# Patient Record
Sex: FEMALE | Race: WHITE | NOT HISPANIC OR LATINO | Employment: FULL TIME | URBAN - METROPOLITAN AREA
[De-identification: names, ages, dates, MRNs, and addresses within clinical notes are randomized per-mention and may not be internally consistent; named-entity substitution may affect disease eponyms.]

---

## 2023-05-08 ENCOUNTER — HOSPITAL ENCOUNTER (EMERGENCY)
Facility: HOSPITAL | Age: 23
Discharge: HOME | End: 2023-05-08
Attending: EMERGENCY MEDICINE | Admitting: EMERGENCY MEDICINE
Payer: COMMERCIAL

## 2023-05-08 VITALS
HEIGHT: 66 IN | RESPIRATION RATE: 16 BRPM | DIASTOLIC BLOOD PRESSURE: 66 MMHG | BODY MASS INDEX: 47.09 KG/M2 | OXYGEN SATURATION: 99 % | HEART RATE: 71 BPM | TEMPERATURE: 98 F | WEIGHT: 293 LBS | SYSTOLIC BLOOD PRESSURE: 102 MMHG

## 2023-05-08 DIAGNOSIS — N93.9 VAGINAL BLEEDING: ICD-10-CM

## 2023-05-08 DIAGNOSIS — O20.0 PREGNANCY WITH EARLY THREATENED ABORTION: Primary | ICD-10-CM

## 2023-05-08 LAB
ABO GROUP BLD: NORMAL
BACTERIA UR QL AUTO: ABNORMAL /HPF
BASOPHILS # BLD AUTO: 0.1 10*3/MM3 (ref 0–0.2)
BASOPHILS NFR BLD AUTO: 0.7 % (ref 0–1.5)
BILIRUB UR QL STRIP: NEGATIVE
CLARITY UR: CLEAR
COLOR UR: ABNORMAL
DEPRECATED RDW RBC AUTO: 43.3 FL (ref 37–54)
EOSINOPHIL # BLD AUTO: 0.1 10*3/MM3 (ref 0–0.4)
EOSINOPHIL NFR BLD AUTO: 0.8 % (ref 0.3–6.2)
ERYTHROCYTE [DISTWIDTH] IN BLOOD BY AUTOMATED COUNT: 16.7 % (ref 12.3–15.4)
GLUCOSE UR STRIP-MCNC: NEGATIVE MG/DL
HCG INTACT+B SERPL-ACNC: 882.1 MIU/ML
HCT VFR BLD AUTO: 36 % (ref 34–46.6)
HGB BLD-MCNC: 11.5 G/DL (ref 12–15.9)
HGB UR QL STRIP.AUTO: ABNORMAL
HOLD SPECIMEN: NORMAL
HYALINE CASTS UR QL AUTO: ABNORMAL /LPF
KETONES UR QL STRIP: NEGATIVE
LEUKOCYTE ESTERASE UR QL STRIP.AUTO: NEGATIVE
LYMPHOCYTES # BLD AUTO: 2.2 10*3/MM3 (ref 0.7–3.1)
LYMPHOCYTES NFR BLD AUTO: 22.5 % (ref 19.6–45.3)
MCH RBC QN AUTO: 23.7 PG (ref 26.6–33)
MCHC RBC AUTO-ENTMCNC: 31.9 G/DL (ref 31.5–35.7)
MCV RBC AUTO: 74.4 FL (ref 79–97)
MONOCYTES # BLD AUTO: 0.6 10*3/MM3 (ref 0.1–0.9)
MONOCYTES NFR BLD AUTO: 6.6 % (ref 5–12)
NEUTROPHILS NFR BLD AUTO: 6.8 10*3/MM3 (ref 1.7–7)
NEUTROPHILS NFR BLD AUTO: 69.4 % (ref 42.7–76)
NITRITE UR QL STRIP: NEGATIVE
NRBC BLD AUTO-RTO: 0 /100 WBC (ref 0–0.2)
NUMBER OF DOSES: NORMAL
PH UR STRIP.AUTO: 5.5 [PH] (ref 5–8)
PLATELET # BLD AUTO: 344 10*3/MM3 (ref 140–450)
PMV BLD AUTO: 6.9 FL (ref 6–12)
PROT UR QL STRIP: ABNORMAL
RBC # BLD AUTO: 4.84 10*6/MM3 (ref 3.77–5.28)
RBC # UR STRIP: ABNORMAL /HPF
REF LAB TEST METHOD: ABNORMAL
RH BLD: POSITIVE
SP GR UR STRIP: 1.03 (ref 1–1.03)
SQUAMOUS #/AREA URNS HPF: ABNORMAL /HPF
UROBILINOGEN UR QL STRIP: ABNORMAL
WBC # UR STRIP: ABNORMAL /HPF
WBC NRBC COR # BLD: 9.8 10*3/MM3 (ref 3.4–10.8)

## 2023-05-08 PROCEDURE — 86900 BLOOD TYPING SEROLOGIC ABO: CPT | Performed by: PHYSICIAN ASSISTANT

## 2023-05-08 PROCEDURE — 81001 URINALYSIS AUTO W/SCOPE: CPT

## 2023-05-08 PROCEDURE — 99283 EMERGENCY DEPT VISIT LOW MDM: CPT

## 2023-05-08 PROCEDURE — 84702 CHORIONIC GONADOTROPIN TEST: CPT | Performed by: PHYSICIAN ASSISTANT

## 2023-05-08 PROCEDURE — 86901 BLOOD TYPING SEROLOGIC RH(D): CPT | Performed by: PHYSICIAN ASSISTANT

## 2023-05-08 PROCEDURE — 85025 COMPLETE CBC W/AUTO DIFF WBC: CPT | Performed by: PHYSICIAN ASSISTANT

## 2023-05-08 RX ORDER — SODIUM CHLORIDE 0.9 % (FLUSH) 0.9 %
10 SYRINGE (ML) INJECTION AS NEEDED
Status: DISCONTINUED | OUTPATIENT
Start: 2023-05-08 | End: 2023-05-09 | Stop reason: HOSPADM

## 2023-05-08 NOTE — ED PROVIDER NOTES
Subjective    Provider in Triage Note  Patient is a 22-year-old female who is G1, P0 Ab0 about 5 to 6 weeks gestation per last menstrual period presenting to the ED with worsening abdominal pain and vaginal bleeding.  Patient states she was seen at [facility] earlier today was told she had a threatened miscarriage.  Patient came here because her bleeding worsened.  She denies any lightheadedness, dizziness, or syncopal episodes.  No reports of fever.  No urinary complaints.    [place] was made aware to get records from [facility] as I do not see them in care everywhere        History of Present Illness  I have read the above note written by previous provider in triage and attest that is accurate for patient's HPI.        Review of Systems   Constitutional: Negative for appetite change and fever.   HENT: Negative for congestion, rhinorrhea and sore throat.    Respiratory: Negative for chest tightness and shortness of breath.    Gastrointestinal: Negative for abdominal pain and nausea.   Genitourinary: Positive for pelvic pain and vaginal bleeding. Negative for flank pain and vaginal pain.   Neurological: Negative for syncope and facial asymmetry.   Psychiatric/Behavioral: Negative for confusion. The patient is not nervous/anxious.    All other systems reviewed and are negative.      History reviewed. No pertinent past medical history.    No Known Allergies    History reviewed. No pertinent surgical history.    History reviewed. No pertinent family history.    Social History     Socioeconomic History   • Marital status: Single           Objective   Physical Exam  Vitals and nursing note reviewed.   Constitutional:       General: She is awake. She is not in acute distress.     Appearance: Normal appearance. She is well-developed. She is obese. She is not diaphoretic.   HENT:      Head: Normocephalic and atraumatic.      Right Ear: Tympanic membrane, ear canal and external ear normal.      Left Ear:  "Tympanic membrane, ear canal and external ear normal.   Eyes:      Extraocular Movements: Extraocular movements intact.      Pupils: Pupils are equal, round, and reactive to light.   Cardiovascular:      Rate and Rhythm: Normal rate and regular rhythm.      Pulses: Normal pulses.      Heart sounds: Normal heart sounds. No murmur heard.  Pulmonary:      Effort: Pulmonary effort is normal.      Breath sounds: Normal breath sounds.   Abdominal:      General: Bowel sounds are normal.      Palpations: Abdomen is soft.   Musculoskeletal:         General: No swelling. Normal range of motion.      Cervical back: Normal range of motion and neck supple.   Skin:     General: Skin is warm and dry.      Capillary Refill: Capillary refill takes less than 2 seconds.   Neurological:      General: No focal deficit present.      Mental Status: She is alert and oriented to person, place, and time. Mental status is at baseline.      GCS: GCS eye subscore is 4. GCS verbal subscore is 5. GCS motor subscore is 6.      Cranial Nerves: Cranial nerves 2-12 are intact.      Sensory: Sensation is intact.      Motor: Motor function is intact.      Deep Tendon Reflexes: Reflexes are normal and symmetric.   Psychiatric:         Mood and Affect: Mood normal.         Behavior: Behavior normal. Behavior is cooperative.         Procedures           ED Course      /69 (BP Location: Left arm, Patient Position: Sitting)   Pulse 79   Temp 98 °F (36.7 °C) (Oral)   Resp 18   Ht 167.6 cm (66\")   Wt (!) 140 kg (308 lb 10.3 oz)   LMP 03/22/2023   SpO2 96%   BMI 49.82 kg/m²   Labs Reviewed   CBC WITH AUTO DIFFERENTIAL - Abnormal; Notable for the following components:       Result Value    Hemoglobin 11.5 (*)     MCV 74.4 (*)     MCH 23.7 (*)     RDW 16.7 (*)     All other components within normal limits   URINALYSIS W/ MICROSCOPIC IF INDICATED (NO CULTURE) - Abnormal; Notable for the following components:    Color, UA Dark Yellow (*)     Blood, " UA Large (3+) (*)     Protein, UA 30 mg/dL (1+) (*)     All other components within normal limits   URINALYSIS, MICROSCOPIC ONLY - Abnormal; Notable for the following components:    RBC, UA Too Numerous to Count (*)     WBC, UA 0-2 (*)     All other components within normal limits   HCG, QUANTITATIVE, PREGNANCY    Narrative:     HCG Ranges by Gestational Age    Females - non-pregnant premenopausal   </= 1mIU/mL HCG  Females - postmenopausal               </= 7mIU/mL HCG    3 Weeks       5.4   -      72 mIU/mL  4 Weeks      10.2   -     708 mIU/mL  5 Weeks       217   -   8,245 mIU/mL  6 Weeks       152   -  32,177 mIU/mL  7 Weeks     4,059   - 153,767 mIU/mL  8 Weeks    31,366   - 149,094 mIU/mL  9 Weeks    59,109   - 135,901 mIU/mL  10 Weeks   44,186   - 170,409 mIU/mL  12 Weeks   27,107   - 201,615 mIU/mL  14 Weeks   24,302   -  93,646 mIU/mL  15 Weeks   12,540   -  69,747 mIU/mL  16 Weeks    8,904   -  55,332 mIU/mL  17 Weeks    8,240   -  51,793 mIU/mL  18 Weeks    9,649   -  55,271 mIU/mL      RHIG EVALUATION   DOSES OF RH IMMUNE GLOBULIN   CBC AND DIFFERENTIAL    Narrative:     The following orders were created for panel order CBC & Differential.  Procedure                               Abnormality         Status                     ---------                               -----------         ------                     CBC Auto Differential[[id number]]        Abnormal            Final result               Scan Slide[[id number]]                                                                    Please view results for these tests on the individual orders.   EXTRA TUBES    Narrative:     The following orders were created for panel order Extra Tubes.  Procedure                               Abnormality         Status                     ---------                               -----------         ------                     Green Top (Gel)[[id number]]                                  Final result              "    Please view results for these tests on the individual orders.   GREEN TOP     Medications   sodium chloride 0.9 % flush 10 mL (has no administration in time range)     No radiology results for the last day                                       Medical Decision Making  Patient is a 22-year-old obese  female who reports being 5 weeks pregnant presents with complaints of vaginal bleeding that started earlier today.  She states that she was seen at [facility] and was discharged with \"threatened miscarriage.\"  She states that she has had worsening cramps and bleeding since she was discharged and was told to return if her symptoms worsen.  Exam reveals normal S1/S2 without clicks or murmurs.  Patient is not tachycardic or pale.  No JVD or leg swelling.  Lungs clear on auscultation in all fields.  Abdomen was found to be soft and nontender with normal bowel sounds heard throughout.  No CVA tenderness.  No cervical lymphadenopathy.  Pupils PERRLA.  GCS 15.  Initial differentials include threatened miscarriage, PID, acute UTI, trauma.    Records were sent from [facility], where patient was seen earlier today.  Upon review of these records, it was noted that patient had a transvaginal ultrasound.  Findings included an early intrauterine pregnancy at 5 weeks gestation by mean sac diameter.  Probable intrinsic fetal pole will also at 5 weeks by crown-rump length of 2 mm.  Cardiac activity was not yet detectable.  A short interval follow-up pelvic ultrasound to confirm viability was recommended.  Quantitative hCG was also collected at this visit and was 108 7.  Blood type is a positive and she is not a RhoGAM candidate.  Labs were recollected today and patient is mildly anemic with a hemoglobin 11.5.  No leukocytosis.  Quantitative hCG here was 882, concerning for spontaneous .  Repeat ultrasound was considered but not completed at this time as I do not " believe it will affect plan of care.  Urine negative for acute UTI.  Upon reassessment, patient denies any changes in bleeding pattern.  Results were discussed with the patient, who verbalized understanding is agreeable to plan of care.  Due to concern for , close follow-up was recommended patient states that she has an appointment already set for Wednesday.  I have advised that she gets a repeat quantitative hCG done at that time as well.  Reasons for return were discussed with the patient and she verbalized understanding.  Hemodynamically she has remained stable and she is in no acute distress.  Patient was able to ambulate upright steadily without assistance upon discharge.      I discussed the findings with patient who voices understanding of discharge instructions, signs and symptoms requiring return to the ED; discharged improved and stable condition with follow-up for reevaluation.    Patient is aware that discharge does not mean that nothing is wrong but it indicates no emergency is present and they must continue care with follow-up as given below or physician of their choice.    This document is intended for medical expert use only.  Reading of this document by patients and/or patient's family without participating medical staff guidance may result in misinterpretation and unintended morbidity.  Any interpretation of such data is the responsibility of the patient and/or family member responsible for the patient in concert with their primary or specialist providers, not to be left for sources of online search as such as Bio, ki work or similar queries.  Relying on these approaches to knowledge may result in misinterpretation, misguided goals of care and even death should patient or family members try recommendations outside of the realm of professional medical care in a supervised inpatient environment.    This medical document was created using Dragon dictation system. Some errors in speech  recognition may occur.    Amount and/or Complexity of Data Reviewed  Labs: ordered. Decision-making details documented in ED Course.      Risk  Prescription drug management.          Final diagnoses:   Pregnancy with early threatened    Vaginal bleeding       ED Disposition  ED Disposition     ED Disposition   Discharge    Condition   Stable    Comment   --             [facility]  [address] [address]  [phone]        PATIENT CONNECTION - [facility] [address]  [phone]             Medication List      No changes were made to your prescriptions during this visit.          Felicity Somers, APRN  [phone]

## 2023-05-09 NOTE — DISCHARGE INSTRUCTIONS
Take Tylenol as needed for pain and cramping.  Continue monitoring amount of bleeding.  Rest.  Drink lots of fluids and avoid heavy lifting or straining.    As discussed, follow-up with OB/GYN as you have scheduled.  Follow-up with primary care provider as needed.  If you do not have a primary care provider, contact patient connection to establish 1.    Return to the ER for new or worsening symptoms.

## 2023-05-09 NOTE — ED NOTES
Pt reports low abdominal cramping with vaginal bleeding that started this morning. Pt was seen at [facility] earlier today and had bloodwork and US done-pt reports was told she was 5.5 weeks pregnant, pt was dx with threatened miscarriage. Pt reports her vaginal bleeding has increased and she is now passing clots and has increased low abdominal pain.

## 2023-12-19 ENCOUNTER — PATIENT ROUNDING (BHMG ONLY) (OUTPATIENT)
Dept: ENDOCRINOLOGY | Facility: CLINIC | Age: 23
End: 2023-12-19
Payer: COMMERCIAL

## 2023-12-19 ENCOUNTER — OFFICE VISIT (OUTPATIENT)
Dept: ENDOCRINOLOGY | Facility: CLINIC | Age: 23
End: 2023-12-19
Payer: COMMERCIAL

## 2023-12-19 VITALS
HEIGHT: 66 IN | SYSTOLIC BLOOD PRESSURE: 118 MMHG | OXYGEN SATURATION: 98 % | WEIGHT: 293 LBS | HEART RATE: 86 BPM | DIASTOLIC BLOOD PRESSURE: 70 MMHG | BODY MASS INDEX: 47.09 KG/M2

## 2023-12-19 DIAGNOSIS — O24.419 GESTATIONAL DIABETES MELLITUS (GDM) IN SECOND TRIMESTER, GESTATIONAL DIABETES METHOD OF CONTROL UNSPECIFIED: Primary | ICD-10-CM

## 2023-12-19 DIAGNOSIS — E66.01 MORBID OBESITY: ICD-10-CM

## 2023-12-19 RX ORDER — BLOOD-GLUCOSE METER
1 KIT MISCELLANEOUS
Qty: 1 EACH | Refills: 0 | Status: SHIPPED | OUTPATIENT
Start: 2023-12-19 | End: 2023-12-22 | Stop reason: SDUPTHER

## 2023-12-19 RX ORDER — LANCETS 30 GAUGE
1 EACH MISCELLANEOUS
Qty: 200 EACH | Refills: 5 | Status: SHIPPED | OUTPATIENT
Start: 2023-12-19

## 2023-12-19 RX ORDER — GLUCOSAMINE HCL/CHONDROITIN SU 500-400 MG
1 CAPSULE ORAL
Qty: 200 EACH | Refills: 5 | Status: SHIPPED | OUTPATIENT
Start: 2023-12-19

## 2023-12-19 NOTE — PROGRESS NOTES
-----------------------------------------------------------------  ENDOCRINE CLINIC NOTE  -----------------------------------------------------------------        PATIENT NAME: Verónica Rivers  PATIENT : 2000 AGE: 23 y.o.  MRN NUMBER: [id number]  PRIMARY CARE: Zulma Patel MD    ==========================================================================    CHIEF COMPLAINT: GDM  DATE OF SERVICE: 23         ==========================================================================    HPI / SUBJECTIVE    23 y.o. female is seen in the clinic today for evaluation of GDM.  Patient is currently G2, P0.  Currently 16 weeks pregnant.  Estimated date of delivery: May 29, 2024.  Patient recently had a blood work done on 10/3/2023 which showed A1c of 5.5% and therefore patient underwent OGTT on 2023 which showed evidence of significant hyperglycemia.  Patient is now referred to endocrinology clinic for further evaluation.  Patient have a family history significant for type 2 diabetes.  Patient is not currently checking blood sugars at home.  No eye doctor/ophthalmology evaluation.  Denies any numbness and tingling in the feet.  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  Consuming 2-3 meals a day.  No structured exercise.    ==========================================================================                                                PAST MEDICAL HISTORY    History reviewed. No pertinent past medical history.    ==========================================================================    PAST SURGICAL HISTORY    Past Surgical History:   Procedure Laterality Date    LAPAROSCOPIC APPENDECTOMY  2019       ==========================================================================    FAMILY HISTORY    Family History   Problem Relation Age of Onset    Diabetes Mother   "      ==========================================================================    SOCIAL HISTORY    Social History     Socioeconomic History    Marital status: Single   Tobacco Use    Smoking status: Never    Smokeless tobacco: Never   Vaping Use    Vaping Use: Never used   Substance and Sexual Activity    Alcohol use: Not Currently    Drug use: Never    Sexual activity: Defer       ==========================================================================    MEDICATIONS      Current Outpatient Medications:     ASPIRIN 81 PO, Take  by mouth Daily., Disp: , Rfl:     Prenatal MV-Min-Fe Fum-FA-DHA (PRENATAL 1 PO), Take  by mouth Daily., Disp: , Rfl:     Alcohol Swabs 70 % pads, Use 1 each 4 (Four) Times a Day Before Meals & at Bedtime., Disp: 200 each, Rfl: 5    glucose blood test strip, 1 each by Other route 4 (Four) Times a Day Before Meals & at Bedtime., Disp: 200 each, Rfl: 5    glucose monitor monitoring kit, Use 1 each 4 (Four) Times a Day Before Meals & at Bedtime., Disp: 1 each, Rfl: 0    Lancets misc, Use 1 each 4 (Four) Times a Day Before Meals & at Bedtime., Disp: 200 each, Rfl: 5    ==========================================================================    ALLERGIES    No Known Allergies    ==========================================================================    OBJECTIVE    Vitals:    12/19/23 1111   BP: 118/70   Pulse: 86   SpO2: 98%     Body mass index is 51 kg/m².     General: Alert, cooperative, no acute distress  Thyroid:  no enlargement/tenderness/palpable nodules  Lungs: Clear to auscultation bilaterally, respirations unlabored  Heart: Regular rate and rhythm, S1 and S2 normal, no murmur, rub or gallop  Abdomen: Soft, NT, ND and Bowel sounds Positive  Extremities:  Extremities normal, atraumatic, no cyanosis or edema    ==========================================================================    LAB EVALUATION    No results found for: \"GLUCOSE\", \"BUN\", \"CREATININE\", \"EGFRIFNONA\", " "\"EGFRIFAFRI\", \"BCR\", \"K\", \"CO2\", \"CALCIUM\", \"PROTENTOTREF\", \"ALBUMIN\", \"LABIL2\", \"BILIRUBIN\", \"AST\", \"ALT\", \"CHOL\", \"TRIG\", \"HDL\", \"LDL\"  No results found for: \"HGBA1C\"  No results found for: \"GLUF\", \"MICROALBUR\", \"CREATININE\"  No results found for: \"TSH\", \"FREET4\", \"THYROIDAB\"    ==========================================================================    ASSESSMENT AND PLAN    # Gestational diabetes mellitus  - Discussed with patient in detail about pathophysiology of type 2 diabetes and of GDM to which she verbalized understanding  - Discussed with patient about dietary modification and referred patient to diabetic education along with GDM class  - Counseled patient about lifestyle modification including exercise every day to which she verbalized understanding and agreed  - Provide detail about diabetic monitoring including blood sugar fasting and 2-hour after each meal and maintaining the log and sending logs to the office every week on Monday to which she verbalized understanding and agreed with care  - Discussed with patient about therapeutic options and plan to use insulin if needed to which she verbalized understanding  - Target blood sugar will remain less than 90 and fasting less than 120 2 hours each meal  - Patient to follow-up in my clinic back again in 4 weeks time  - Depending on the blood sugars number if needed can also introduce insulin therapy    # Morbid obesity with BMI of 51      Thank you for courtesy of consultation.    Return to clinic: 4 weeks    Entire assessment and plan was discussed and counseled the patient in detail to which patient verbalized understanding and agreed with care.  Answered all queries and concerns.    This note was created using voice recognition software and is inherently subject to errors including those of syntax and \"sound-alike\" substitutions which may escape proofreading.  In such instances, original meaning may be extrapolated by contextual derivation.    Note: " Portions of this note may have been copied from previous notes but documentation have been reviewed and edited as necessary to support clinical decision making for today's visit.    ==========================================================================    INFORMATION PROVIDED TO PATIENT    Patient Instructions   Please,    - Check your blood sugars 4 times a day: Fasting, 2 hours after each meal.  Please maintain the log of your blood sugars and maik the office every Monday morning.    - Please try to consume high-protein and low carbohydrate diet.    - Please try to maintain exercise scheduled at least 30 minutes every day.    - Please make appointment for diabetic education.    - Please make appointment for diabetic eye care.    Follow-up in my clinic back again in 4 weeks time.    Thank you for your visit today.    If you have any questions or concerns please feel free to reach out of the office.       ==========================================================================  Austin Mcnamara MD  Department of Endocrine, Diabetes and Metabolism  [facility], [place]  ==========================================================================

## 2023-12-19 NOTE — PATIENT INSTRUCTIONS
Please,    - Check your blood sugars 4 times a day: Fasting, 2 hours after each meal.  Please maintain the log of your blood sugars and maik the office every Monday morning.    - Please try to consume high-protein and low carbohydrate diet.    - Please try to maintain exercise scheduled at least 30 minutes every day.    - Please make appointment for diabetic education.    - Please make appointment for diabetic eye care.    Follow-up in my clinic back again in 4 weeks time.    Thank you for your visit today.    If you have any questions or concerns please feel free to reach out of the office.

## 2023-12-19 NOTE — PROGRESS NOTES
December 19, 2023    Hello, may I speak with Verónica Rivers?    My name is Eunice       I am  with [facility]  [address] [facility] [address] [place] [address].    Before we get started may I verify your date of birth? 2000    I am calling to officially welcome you to our practice and ask about your recent visit. Is this a good time to talk? yes    Tell me about your visit with us. What things went well?  it went well       We're always looking for ways to make our patients' experiences even better. Do you have recommendations on ways we may improve?  no    Overall were you satisfied with your first visit to our practice? yes       I appreciate you taking the time to speak with me today. Is there anything else I can do for you? no      Thank you, and have a great day.

## 2023-12-22 DIAGNOSIS — O24.419 GESTATIONAL DIABETES MELLITUS (GDM) IN SECOND TRIMESTER, GESTATIONAL DIABETES METHOD OF CONTROL UNSPECIFIED: ICD-10-CM

## 2023-12-22 RX ORDER — BLOOD-GLUCOSE METER
1 KIT MISCELLANEOUS
Qty: 1 EACH | Refills: 0 | Status: SHIPPED | OUTPATIENT
Start: 2023-12-22

## 2023-12-22 NOTE — TELEPHONE ENCOUNTER
Caller: Verónica Rivers    Relationship: Self    Best call back number: [phone]    Requested Prescriptions: NEEDS IT TO BE RESENT TO [facility]  Requested Prescriptions     Pending Prescriptions Disp Refills    glucose blood test strip 200 each 5     Si each by Other route 4 (Four) Times a Day Before Meals & at Bedtime.    glucose monitor monitoring kit 1 each 0     Sig: Use 1 each 4 (Four) Times a Day Before Meals & at Bedtime.        Pharmacy where request should be sent: [facility]/PHARMACY #[id number] - [address] [phone] [facility] [phone]      Last office visit with prescribing clinician: 2023   Last telemedicine visit with prescribing clinician: Visit date not found   Next office visit with prescribing clinician: 2024     Additional details provided by patient:     Does the patient have less than a 3 day supply:  [x] Yes  [] No    Would you like a call back once the refill request has been completed: [x] Yes [] No    If the office needs to give you a call back, can they leave a voicemail: [x] Yes [] No    Aylin Madrid Rep   23 09:20 EST

## 2023-12-26 ENCOUNTER — TELEPHONE (OUTPATIENT)
Dept: ENDOCRINOLOGY | Facility: CLINIC | Age: 23
End: 2023-12-26
Payer: COMMERCIAL

## 2023-12-26 NOTE — TELEPHONE ENCOUNTER
Reviewed BG log from 12/21-12/24/23. FBG looks good. Lunch PP above goal 3/4 times. Unable to reach pt by phone/unable to leave message. Emailed pt and asked to send BG from yesterday and this morning. Considering meal time insulin before lunch. Noted pt in ED on 12/20/23 r/t covid.  Pt to continue sending weekly BG log on Monday for RD to review by Tuesday.

## 2024-01-02 ENCOUNTER — TELEPHONE (OUTPATIENT)
Dept: ENDOCRINOLOGY | Facility: CLINIC | Age: 24
End: 2024-01-02
Payer: COMMERCIAL

## 2024-01-02 NOTE — TELEPHONE ENCOUNTER
Reviewed BG log from 12/25-12/31/23. 1/7 FBG above goal, 1/7 breakfast PP above goal, 2/7 supper PP above goal. Pt doing well. No changes at this time. Please see media for details.

## 2024-01-08 ENCOUNTER — TELEPHONE (OUTPATIENT)
Dept: ENDOCRINOLOGY | Facility: CLINIC | Age: 24
End: 2024-01-08
Payer: COMMERCIAL

## 2024-01-08 NOTE — TELEPHONE ENCOUNTER
Reviewed BG log from 1/1/24-1/7/24. FBG and breakfast PP all WNL. Pt with 3/7 elevated lunch PP (121, 122, 140)   and 2/7 supper PP above goal. Unable to reach pt by phone so emailed asking her to call when able. Plan to start lunch time insulin 4 units Humalog 15 min before meal per MD s/o, but pt will need to be educated on use. Pt also notes that she is out of test strips due to backorder. Plan to send to alternative pharmacy or offer samples when she returns call. Please see media for details.

## 2024-01-16 ENCOUNTER — OFFICE VISIT (OUTPATIENT)
Dept: ENDOCRINOLOGY | Facility: CLINIC | Age: 24
End: 2024-01-16
Payer: COMMERCIAL

## 2024-01-16 VITALS
DIASTOLIC BLOOD PRESSURE: 70 MMHG | HEART RATE: 88 BPM | HEIGHT: 66 IN | OXYGEN SATURATION: 98 % | BODY MASS INDEX: 47.09 KG/M2 | SYSTOLIC BLOOD PRESSURE: 110 MMHG | WEIGHT: 293 LBS

## 2024-01-16 DIAGNOSIS — E66.01 MORBID OBESITY: ICD-10-CM

## 2024-01-16 DIAGNOSIS — O24.419 GESTATIONAL DIABETES MELLITUS (GDM) IN SECOND TRIMESTER, GESTATIONAL DIABETES METHOD OF CONTROL UNSPECIFIED: Primary | ICD-10-CM

## 2024-01-16 LAB — GLUCOSE BLDC GLUCOMTR-MCNC: 92 MG/DL (ref 70–105)

## 2024-01-16 PROCEDURE — 82948 REAGENT STRIP/BLOOD GLUCOSE: CPT | Performed by: INTERNAL MEDICINE

## 2024-01-16 PROCEDURE — 99214 OFFICE O/P EST MOD 30 MIN: CPT | Performed by: INTERNAL MEDICINE

## 2024-01-16 RX ORDER — BLOOD-GLUCOSE METER
EACH MISCELLANEOUS
COMMUNITY
Start: 2023-12-21

## 2024-01-16 NOTE — PROGRESS NOTES
-----------------------------------------------------------------  ENDOCRINE CLINIC NOTE  -----------------------------------------------------------------        PATIENT NAME: Verónica Rivers  PATIENT : 2000 AGE: 23 y.o.  MRN NUMBER: [id number]  PRIMARY CARE: Zulma Patel MD    ==========================================================================    CHIEF COMPLAINT: GDM  DATE OF SERVICE: 24    ==========================================================================    HPI / SUBJECTIVE    23 y.o. female is seen in the clinic today for follow-up for GDM.  Patient is currently 20 weeks pregnant.  Estimated date of delivery is May 29, 2024.  Patient is currently G2, P0.  Family history of type 2 diabetes.  Patient is also following and evaluating diabetic educator.  Still planning to schedule GDM class.  Consuming 3 meals a day.  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  Patient is currently checking blood sugar 4 times a day that includes fasting and 2-hour postprandial.    ==========================================================================                                                PAST MEDICAL HISTORY    History reviewed. No pertinent past medical history.    ==========================================================================    PAST SURGICAL HISTORY    Past Surgical History:   Procedure Laterality Date    LAPAROSCOPIC APPENDECTOMY  2019       ==========================================================================    FAMILY HISTORY    Family History   Problem Relation Age of Onset    Diabetes Mother        ==========================================================================    SOCIAL HISTORY    Social History     Socioeconomic History    Marital status: Single   Tobacco Use    Smoking status: Never    Smokeless tobacco: Never   Vaping Use    Vaping Use: Never used   Substance and Sexual Activity     Alcohol use: Not Currently    Drug use: Never    Sexual activity: Defer       ==========================================================================    MEDICATIONS      Current Outpatient Medications:     Alcohol Swabs 70 % pads, Use 1 each 4 (Four) Times a Day Before Meals & at Bedtime., Disp: 200 each, Rfl: 5    ASPIRIN 81 PO, Take  by mouth Daily., Disp: , Rfl:     Blood Glucose Monitoring Suppl (Accu-Chek Guide Me) w/Device kit, USE METER TO TEST BLOOD SUGAR 4 TIMES DAILY BEFORE MEALS AND AT BEDTIME, Disp: , Rfl:     glucose blood test strip, 1 each by Other route 4 (Four) Times a Day Before Meals & at Bedtime., Disp: 200 each, Rfl: 5    glucose monitor monitoring kit, Use 1 each 4 (Four) Times a Day Before Meals & at Bedtime., Disp: 1 each, Rfl: 0    Lancets misc, Use 1 each 4 (Four) Times a Day Before Meals & at Bedtime., Disp: 200 each, Rfl: 5    Prenatal MV-Min-Fe Fum-FA-DHA (PRENATAL 1 PO), Take  by mouth Daily., Disp: , Rfl:     ==========================================================================    ALLERGIES    No Known Allergies    ==========================================================================    OBJECTIVE    Vitals:    01/16/24 1237   BP: 110/70   Pulse: 88   SpO2: 98%     Body mass index is 49.71 kg/m².     General: Alert, cooperative, no acute distress  Thyroid:  no enlargement/tenderness/palpable nodules  Lungs: Clear to auscultation bilaterally, respirations unlabored  Heart: Regular rate and rhythm, S1 and S2 normal, no murmur, rub or gallop  Abdomen: Soft, NT, ND and Bowel sounds Positive  Extremities:  Extremities normal, atraumatic, no cyanosis or edema      ==========================================================================    ASSESSMENT AND PLAN    # Gestational diabetes mellitus  -Reviewed blood sugar up till 1/7/2024 there is evidence of some postprandial hyperglycemia after lunch and supper  - Patient is consuming good protein diet but is also having a little  "bit more higher carbohydrate content of the meal, counseled patient to minimize the use of carbohydrates  - Patient to continue physical activity  - Patient have not checked any blood sugars since 1/7/2024 due to unavailability of the testing supplies, provided sample up to 50 strips for now and patient is supposed to receive supplies in next 1 week time from the pharmacy  - Patient to continue checking blood sugar fasting and postprandial and send the records back to the office every Monday for us to evaluate and adjust therapy  - There is a potential patient may need insulin therapy during the lunch and suppertime which was discussed with patient in detail  - Patient to follow-up in clinic back again in 4 weeks time    # Morbid obesity with BMI of 49.71    Thank you for courtesy of consultation.    Return to clinic: 4 weeks    Entire assessment and plan was discussed and counseled the patient in detail to which patient verbalized understanding and agreed with care.  Answered all queries and concerns.    This note was created using voice recognition software and is inherently subject to errors including those of syntax and \"sound-alike\" substitutions which may escape proofreading.  In such instances, original meaning may be extrapolated by contextual derivation.    Note: Portions of this note may have been copied from previous notes but documentation have been reviewed and edited as necessary to support clinical decision making for today's visit.    ==========================================================================    INFORMATION PROVIDED TO PATIENT    Patient Instructions   Please,    - Continue to have good protein intake and avoid carbohydrates as much as possible.  - Continue to check your blood sugar 4 times a day, fasting and 2-hour after each meal.  Depending on to your blood glucose numbers we may need to introduce insulin therapy.  Please keep sending the blood glucose logs on every Monday to the " office via email or fax.    Follow-up in my clinic back again in 4 weeks time.    Thank you for your visit today.    If you have any questions or concerns please feel free to reach out of the office.       ==========================================================================  Austin Mcnamara MD  Department of Endocrine, Diabetes and Metabolism  [place], [place]  ==========================================================================

## 2024-01-16 NOTE — PATIENT INSTRUCTIONS
Please,    - Continue to have good protein intake and avoid carbohydrates as much as possible.  - Continue to check your blood sugar 4 times a day, fasting and 2-hour after each meal.  Depending on to your blood glucose numbers we may need to introduce insulin therapy.  Please keep sending the blood glucose logs on every Monday to the office via email or fax.    Follow-up in my clinic back again in 4 weeks time.    Thank you for your visit today.    If you have any questions or concerns please feel free to reach out of the office.

## 2024-01-17 ENCOUNTER — SPECIALTY PHARMACY (OUTPATIENT)
Dept: ENDOCRINOLOGY | Facility: CLINIC | Age: 24
End: 2024-01-17
Payer: COMMERCIAL

## 2024-01-17 NOTE — PROGRESS NOTES
Specialty Pharmacy Patient Management Program  One-Time Clinical Outreach     Verónica Rivers is a 23 y.o. female seen by an Endocrinology provider for Other (Specify): gestational diabetes.    Met with patient in clinic to facilitate with patient getting testing supplies. Patient reports that her primary insurance is sending her a new meter along with 1000 strips and 1000 lancets. Patient was also provided with a meter in clinic along with 2 boxes of strips. Patient states that the supply that she will be getting in the mail should cover the rest of her pregnancy. Since she was given supply in the office today, I provided patient with my direct contact info to call if she is getting low on supplies (I asked that she call if she has only 1 week left, to give time foe us to processes claims and submit any documentation to the insurance that might be needed). Patient expressed understanding and agrees to call if she needs additional supplies.    Meryl Ramirez, PharmD  Clinical Specialty Pharmacist, Endocrinology  1/17/2024  08:41 EST   
none

## 2024-01-22 ENCOUNTER — TELEPHONE (OUTPATIENT)
Dept: ENDOCRINOLOGY | Facility: CLINIC | Age: 24
End: 2024-01-22
Payer: COMMERCIAL

## 2024-01-22 NOTE — TELEPHONE ENCOUNTER
Reviewed BG log from 1/16-0/21/24. Blood sugars look great! Pt to continue sending weekly log for review. See media for details.

## 2024-01-30 ENCOUNTER — TELEPHONE (OUTPATIENT)
Dept: ENDOCRINOLOGY | Facility: CLINIC | Age: 24
End: 2024-01-30
Payer: COMMERCIAL

## 2024-01-30 NOTE — TELEPHONE ENCOUNTER
Reviewed BG log from 1/22-1/28/24. BG look great! No changes. Pt to continue sending weekly for review.

## 2024-02-06 ENCOUNTER — TELEPHONE (OUTPATIENT)
Dept: ENDOCRINOLOGY | Facility: CLINIC | Age: 24
End: 2024-02-06
Payer: COMMERCIAL

## 2024-02-06 NOTE — TELEPHONE ENCOUNTER
Reviewed BG log from 1/29-2/4/24. BG look great. 1 supper PP above goal. No DM meds. Pt to continue sending weekly blood sugar log for review.

## 2024-02-13 ENCOUNTER — OFFICE VISIT (OUTPATIENT)
Dept: ENDOCRINOLOGY | Facility: CLINIC | Age: 24
End: 2024-02-13
Payer: COMMERCIAL

## 2024-02-13 VITALS
BODY MASS INDEX: 47.09 KG/M2 | WEIGHT: 293 LBS | HEIGHT: 66 IN | OXYGEN SATURATION: 97 % | HEART RATE: 98 BPM | SYSTOLIC BLOOD PRESSURE: 122 MMHG | DIASTOLIC BLOOD PRESSURE: 70 MMHG

## 2024-02-13 DIAGNOSIS — O24.419 GESTATIONAL DIABETES MELLITUS (GDM) IN SECOND TRIMESTER, GESTATIONAL DIABETES METHOD OF CONTROL UNSPECIFIED: Primary | ICD-10-CM

## 2024-02-13 DIAGNOSIS — E66.01 MORBID OBESITY: ICD-10-CM

## 2024-02-13 LAB — GLUCOSE BLDC GLUCOMTR-MCNC: 137 MG/DL (ref 70–105)

## 2024-02-13 PROCEDURE — 82948 REAGENT STRIP/BLOOD GLUCOSE: CPT | Performed by: INTERNAL MEDICINE

## 2024-02-13 PROCEDURE — 99214 OFFICE O/P EST MOD 30 MIN: CPT | Performed by: INTERNAL MEDICINE

## 2024-02-20 ENCOUNTER — TELEPHONE (OUTPATIENT)
Dept: ENDOCRINOLOGY | Facility: CLINIC | Age: 24
End: 2024-02-20

## 2024-02-20 NOTE — TELEPHONE ENCOUNTER
Reviewed BG log from 2/12-2/18/24. BG look great. DM managed by lifestyle. Pt to continue sending weekly BG log for review.

## 2024-02-27 ENCOUNTER — TELEPHONE (OUTPATIENT)
Dept: ENDOCRINOLOGY | Facility: CLINIC | Age: 24
End: 2024-02-27
Payer: COMMERCIAL

## 2024-02-27 NOTE — TELEPHONE ENCOUNTER
Reviewed BG log from 2/19-2/25/24. BG continue to look great! No changes. Pt to continue sending weekly log for review.

## 2024-03-06 ENCOUNTER — HOSPITAL ENCOUNTER (EMERGENCY)
Facility: HOSPITAL | Age: 24
Discharge: STILL ADMITTED OR ADMINISTRATIVE | End: 2024-03-06
Payer: COMMERCIAL

## 2024-03-06 ENCOUNTER — APPOINTMENT (OUTPATIENT)
Dept: ULTRASOUND IMAGING | Facility: HOSPITAL | Age: 24
End: 2024-03-06
Payer: COMMERCIAL

## 2024-03-06 ENCOUNTER — HOSPITAL ENCOUNTER (OUTPATIENT)
Facility: HOSPITAL | Age: 24
Discharge: HOME | End: 2024-03-06
Attending: STUDENT IN AN ORGANIZED HEALTH CARE EDUCATION/TRAINING PROGRAM | Admitting: STUDENT IN AN ORGANIZED HEALTH CARE EDUCATION/TRAINING PROGRAM
Payer: COMMERCIAL

## 2024-03-06 VITALS
HEART RATE: 83 BPM | WEIGHT: 293 LBS | BODY MASS INDEX: 49.21 KG/M2 | SYSTOLIC BLOOD PRESSURE: 108 MMHG | DIASTOLIC BLOOD PRESSURE: 72 MMHG | OXYGEN SATURATION: 97 % | RESPIRATION RATE: 18 BRPM | TEMPERATURE: 97.9 F

## 2024-03-06 PROCEDURE — 76815 OB US LIMITED FETUS(S): CPT

## 2024-03-06 PROCEDURE — G0463 HOSPITAL OUTPT CLINIC VISIT: HCPCS

## 2024-03-06 PROCEDURE — 59025 FETAL NON-STRESS TEST: CPT

## 2024-03-06 RX ORDER — ACETAMINOPHEN 500 MG
1000 TABLET ORAL ONCE
Status: COMPLETED | OUTPATIENT
Start: 2024-03-06 | End: 2024-03-06

## 2024-03-06 RX ADMIN — ACETAMINOPHEN 1000 MG: 500 TABLET, FILM COATED ORAL at 15:26

## 2024-03-06 NOTE — LETTER
March 6, 2024     Patient: Verónica Rivers   YOB: 2000   Date of Visit: 3/6/2024       To Whom It May Concern:    Verónica Rivers was seen at [facility] labor and delivery department on 3/06/2024, for evaluation. It is my medical opinion that Verónica Rivers may return to work on Monday March 11, 2024  .           Sincerely,      Zulma Patel MD

## 2024-03-06 NOTE — PLAN OF CARE
Goal Outcome Evaluation:                           Patient is a , arrived at 1348 with c/o abd pain rating it 4/10, spotting this am on toilet paper, not currently bleeding, fever of 100 yesterday, cough, and congestion. States she has intercourse recently. GDM with this pregnancy. Dr. Patel notified and ordered a cervial length u/s (3.68), vag exam (closed), 1 gram of tylenol, and NST (reactive) . Okay to d/c per Dr. Patel at this time. Patient verbalizes understanding of education and agreeable with treatment plan

## 2024-03-06 NOTE — NON STRESS TEST
Obstetrical Non-stress Test Interpretation     Name:  Verónica Rivers  MRN: [id number]    23 y.o. female  at 28w0d    Indication: Triage       Baseline: Normal 110-160 bpm  Variability:   Moderate/Normal (amplitude 6-25 bpm)  Accelerations: Present (<32 weeks) 10 x 10 bpm   Decelerations: Absent   Contractions:  Absent       Impression:  Category I       Lelo Quiroz RN  3/6/2024  17:40 EST   Verified by Mona MORFIN RN

## 2024-03-07 ENCOUNTER — TELEPHONE (OUTPATIENT)
Dept: ENDOCRINOLOGY | Facility: CLINIC | Age: 24
End: 2024-03-07
Payer: COMMERCIAL

## 2024-03-07 NOTE — TELEPHONE ENCOUNTER
Reviewed BG log from 2/26-3/3/24. BG continue to look great! Per chart pt admitted to hospital for delivery. Pt to f/u with Dr Mcnamara in 6 weeks.

## 2024-03-12 ENCOUNTER — OFFICE VISIT (OUTPATIENT)
Dept: ENDOCRINOLOGY | Facility: CLINIC | Age: 24
End: 2024-03-12
Payer: COMMERCIAL

## 2024-03-12 VITALS
WEIGHT: 293 LBS | SYSTOLIC BLOOD PRESSURE: 102 MMHG | DIASTOLIC BLOOD PRESSURE: 70 MMHG | OXYGEN SATURATION: 98 % | BODY MASS INDEX: 47.09 KG/M2 | HEART RATE: 84 BPM | HEIGHT: 66 IN

## 2024-03-12 DIAGNOSIS — O24.410 DIET CONTROLLED GESTATIONAL DIABETES MELLITUS (GDM) IN THIRD TRIMESTER: Primary | ICD-10-CM

## 2024-03-12 DIAGNOSIS — E66.01 MORBID OBESITY: ICD-10-CM

## 2024-03-12 PROCEDURE — 99213 OFFICE O/P EST LOW 20 MIN: CPT | Performed by: INTERNAL MEDICINE

## 2024-03-12 NOTE — PROGRESS NOTES
-----------------------------------------------------------------  ENDOCRINE CLINIC NOTE  -----------------------------------------------------------------        PATIENT NAME: Verónica Rivers  PATIENT : 2000 AGE: 23 y.o.  MRN NUMBER: [id number]  PRIMARY CARE: Zulma Patel MD    ==========================================================================    CHIEF COMPLAINT: GDM  DATE OF SERVICE: 24    ==========================================================================    HPI / SUBJECTIVE    23 y.o. female is seen in the clinic today for follow-up for GDM.  Patient is currently 28+6 weeks pregnant.  Patient recently had A1c drawn at primary care which was 5.1% on 3/5/2024.  Estimated date of delivery is May 29, 2024.  Patient is currently G2, P0.  Family history of type 2 diabetes.  Consuming 3 meals a day.    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  Patient is currently checking blood sugar 4 times a day that includes fasting and 2-hour postprandial.    ==========================================================================                                                PAST MEDICAL HISTORY    History reviewed. No pertinent past medical history.    ==========================================================================    PAST SURGICAL HISTORY    Past Surgical History:   Procedure Laterality Date    LAPAROSCOPIC APPENDECTOMY  2019       ==========================================================================    FAMILY HISTORY    Family History   Problem Relation Age of Onset    Diabetes Mother        ==========================================================================    SOCIAL HISTORY    Social History     Socioeconomic History    Marital status: Single    Number of children: 0    Years of education: 12   Tobacco Use    Smoking status: Never    Smokeless tobacco: Never   Vaping Use    Vaping status: Never Used    Substance and Sexual Activity    Alcohol use: Not Currently    Drug use: Never    Sexual activity: Defer       ==========================================================================    MEDICATIONS      Current Outpatient Medications:     Alcohol Swabs 70 % pads, Use 1 each 4 (Four) Times a Day Before Meals & at Bedtime., Disp: 200 each, Rfl: 5    ASPIRIN 81 PO, Take  by mouth Daily., Disp: , Rfl:     Blood Glucose Monitoring Suppl (Accu-Chek Guide Me) w/Device kit, USE METER TO TEST BLOOD SUGAR 4 TIMES DAILY BEFORE MEALS AND AT BEDTIME, Disp: , Rfl:     glucose blood test strip, 1 each by Other route 4 (Four) Times a Day Before Meals & at Bedtime., Disp: 200 each, Rfl: 5    glucose monitor monitoring kit, Use 1 each 4 (Four) Times a Day Before Meals & at Bedtime., Disp: 1 each, Rfl: 0    Lancets misc, Use 1 each 4 (Four) Times a Day Before Meals & at Bedtime., Disp: 200 each, Rfl: 5    Prenatal MV-Min-Fe Fum-FA-DHA (PRENATAL 1 PO), Take  by mouth Daily., Disp: , Rfl:     ==========================================================================    ALLERGIES    No Known Allergies    ==========================================================================    OBJECTIVE    Vitals:    03/12/24 1215   BP: 102/70   Pulse: 84   SpO2: 98%     Body mass index is 48.58 kg/m².     General: Alert, cooperative, no acute distress    ==========================================================================    ASSESSMENT AND PLAN    # Gestational diabetes mellitus  - Reviewed blood glucose data and blood sugar continues to within acceptable limit  - Continue lifestyle modification with healthy diet and exercise  - Patient most likely will require insulin therapy during the third trimester  - Patient to continue checking blood sugar fasting and 2-hour postmeal and keep sending the blood sugars every Monday morning for us to review and adjust therapy accordingly  - Patient to follow-up in the clinic back again in 4 weeks  "time  - Patient have lost weight since the last which and appreciated patient about it    # Morbid obesity with BMI of 48.58    Thank you for courtesy of consultation.    Return to clinic: 4 weeks    Entire assessment and plan was discussed and counseled the patient in detail to which patient verbalized understanding and agreed with care.  Answered all queries and concerns.    This note was created using voice recognition software and is inherently subject to errors including those of syntax and \"sound-alike\" substitutions which may escape proofreading.  In such instances, original meaning may be extrapolated by contextual derivation.    Note: Portions of this note may have been copied from previous notes but documentation have been reviewed and edited as necessary to support clinical decision making for today's visit.    ==========================================================================    INFORMATION PROVIDED TO PATIENT    Patient Instructions   Please,    - Continue to have good protein intake and avoid carbohydrates as much as possible.  - Continue to check your blood sugar 4 times a day, fasting and 2-hour after each meal.  Depending on to your blood glucose numbers we may need to introduce insulin therapy.  Please keep sending the blood glucose logs on every Monday to the office via email or fax.    Follow-up in my clinic back again in 4 weeks time.    Thank you for your visit today.    If you have any questions or concerns please feel free to reach out of the office.       ==========================================================================  Austin Mcnamara MD  Department of Endocrine, Diabetes and Metabolism  [facility], [place]  ==========================================================================  "

## 2024-03-13 ENCOUNTER — TELEPHONE (OUTPATIENT)
Dept: ENDOCRINOLOGY | Facility: CLINIC | Age: 24
End: 2024-03-13
Payer: COMMERCIAL

## 2024-03-13 NOTE — TELEPHONE ENCOUNTER
"Reviewed fax of BG readings from 3/4-3/10/24 and all within ranged.  No changes at this time.  Attempted to call, but \"not accepting calls at this time.\"  "

## 2024-03-19 ENCOUNTER — OFFICE VISIT (OUTPATIENT)
Dept: ENDOCRINOLOGY | Facility: CLINIC | Age: 24
End: 2024-03-19
Payer: COMMERCIAL

## 2024-03-19 DIAGNOSIS — O24.410 DIET CONTROLLED GESTATIONAL DIABETES MELLITUS (GDM) IN THIRD TRIMESTER: Primary | ICD-10-CM

## 2024-03-19 PROCEDURE — G0109 DIAB MANAGE TRN IND/GROUP: HCPCS

## 2024-03-19 NOTE — PROGRESS NOTES
Pt here today from 9-10:10 AM (1 hr 10 min) for GDM class. Pt is 29 w6d gestation, ANA 5/29/24. Pt doing very well with blood sugar control. Reviewed log from 3/11-3/17/24. All WNL. Pt was able to demo BG check with meter, along with insulin injection. Pt is not currently on insulin but incase it will be needed in the future. Counseled pt on plate method, reading nutrition facts label, and carb counting. Also dicussed GDM pathophysiology, s/s of low BG, and treatment for hypo/hyperglycemia. Pt to continue sending BG log weekly for review.

## 2024-03-26 ENCOUNTER — TELEPHONE (OUTPATIENT)
Dept: ENDOCRINOLOGY | Facility: CLINIC | Age: 24
End: 2024-03-26
Payer: COMMERCIAL

## 2024-03-26 NOTE — TELEPHONE ENCOUNTER
Reviewed BG readings, all within normal limits, attempted to call, not accepting phone calls at this time.

## 2024-04-09 ENCOUNTER — OFFICE VISIT (OUTPATIENT)
Dept: ENDOCRINOLOGY | Facility: CLINIC | Age: 24
End: 2024-04-09
Payer: COMMERCIAL

## 2024-04-09 VITALS
OXYGEN SATURATION: 98 % | SYSTOLIC BLOOD PRESSURE: 110 MMHG | HEIGHT: 66 IN | BODY MASS INDEX: 47.09 KG/M2 | DIASTOLIC BLOOD PRESSURE: 68 MMHG | WEIGHT: 293 LBS | HEART RATE: 76 BPM

## 2024-04-09 DIAGNOSIS — O24.419 GESTATIONAL DIABETES MELLITUS (GDM) IN SECOND TRIMESTER, GESTATIONAL DIABETES METHOD OF CONTROL UNSPECIFIED: Primary | ICD-10-CM

## 2024-04-09 DIAGNOSIS — E66.01 MORBID OBESITY: ICD-10-CM

## 2024-04-09 DIAGNOSIS — O24.410 DIET CONTROLLED GESTATIONAL DIABETES MELLITUS (GDM) IN THIRD TRIMESTER: ICD-10-CM

## 2024-04-09 PROCEDURE — 99214 OFFICE O/P EST MOD 30 MIN: CPT | Performed by: INTERNAL MEDICINE

## 2024-04-09 NOTE — PROGRESS NOTES
-----------------------------------------------------------------  ENDOCRINE CLINIC NOTE  -----------------------------------------------------------------        PATIENT NAME: Verónica Rivers  PATIENT : 2000 AGE: 23 y.o.  MRN NUMBER: [id number]  PRIMARY CARE: Zulma Patel MD    ==========================================================================    CHIEF COMPLAINT: GDM  DATE OF SERVICE: 24    ==========================================================================    HPI / SUBJECTIVE    23 y.o. female is seen in the clinic today for follow-up for GDM.  Patient is currently 32+6 weeks pregnant.  Last A1c was 5.1% on 3/5/2024.  Estimated date of delivery is May 29, 2024.  Patient is currently G2, P0.  Family history of type 2 diabetes.  Consuming 3 meals a day.    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  Patient is currently checking blood sugar 4 times a day that includes fasting and 2-hour postprandial.    ==========================================================================                                                PAST MEDICAL HISTORY    History reviewed. No pertinent past medical history.    ==========================================================================    PAST SURGICAL HISTORY    Past Surgical History:   Procedure Laterality Date    LAPAROSCOPIC APPENDECTOMY  2019       ==========================================================================    FAMILY HISTORY    Family History   Problem Relation Age of Onset    Diabetes Mother        ==========================================================================    SOCIAL HISTORY    Social History     Socioeconomic History    Marital status: Single    Number of children: 0    Years of education: 12   Tobacco Use    Smoking status: Never    Smokeless tobacco: Never   Vaping Use    Vaping status: Never Used   Substance and Sexual Activity    Alcohol use: Not Currently     Drug use: Never    Sexual activity: Defer       ==========================================================================    MEDICATIONS      Current Outpatient Medications:     Alcohol Swabs 70 % pads, Use 1 each 4 (Four) Times a Day Before Meals & at Bedtime., Disp: 200 each, Rfl: 5    ASPIRIN 81 PO, Take  by mouth Daily., Disp: , Rfl:     Blood Glucose Monitoring Suppl (Accu-Chek Guide Me) w/Device kit, USE METER TO TEST BLOOD SUGAR 4 TIMES DAILY BEFORE MEALS AND AT BEDTIME, Disp: , Rfl:     glucose blood test strip, 1 each by Other route 4 (Four) Times a Day Before Meals & at Bedtime., Disp: 200 each, Rfl: 5    glucose monitor monitoring kit, Use 1 each 4 (Four) Times a Day Before Meals & at Bedtime., Disp: 1 each, Rfl: 0    Lancets misc, Use 1 each 4 (Four) Times a Day Before Meals & at Bedtime., Disp: 200 each, Rfl: 5    Prenatal MV-Min-Fe Fum-FA-DHA (PRENATAL 1 PO), Take  by mouth Daily., Disp: , Rfl:     ==========================================================================    ALLERGIES    No Known Allergies    ==========================================================================    OBJECTIVE    Vitals:    04/09/24 1226   BP: 110/68   Pulse: 76   SpO2: 98%     Body mass index is 47.94 kg/m².     General: Alert, cooperative, no acute distress    ==========================================================================    ASSESSMENT AND PLAN    # Gestational diabetes mellitus  # Third Trimester Pregnancy  - Reviewed blood glucose data and blood sugar continues to within acceptable limit  - Continue lifestyle modification with healthy diet and exercise, counseled patient about diet modification during the lunchtime because blood sugars typically running between 115 and 119  - Patient might require insulin therapy more closer during the term  - Patient to continue checking blood sugar fasting and 2-hour postmeal and keep sending the blood sugars every Monday morning for us to review and adjust  "therapy accordingly  - Patient to follow-up in the clinic back again in 4 weeks time    # Morbid obesity with BMI of 48    Thank you for courtesy of consultation.    Return to clinic: 4 weeks    Entire assessment and plan was discussed and counseled the patient in detail to which patient verbalized understanding and agreed with care.  Answered all queries and concerns.    This note was created using voice recognition software and is inherently subject to errors including those of syntax and \"sound-alike\" substitutions which may escape proofreading.  In such instances, original meaning may be extrapolated by contextual derivation.    Note: Portions of this note may have been copied from previous notes but documentation have been reviewed and edited as necessary to support clinical decision making for today's visit.    ==========================================================================    INFORMATION PROVIDED TO PATIENT    Patient Instructions   Please,    - Continue to have good protein intake and avoid carbohydrates as much as possible.  - Continue to check your blood sugar 4 times a day, fasting and 2-hour after each meal.  Depending on to your blood glucose numbers we may need to introduce insulin therapy.  Please keep sending the blood glucose logs on every Monday to the office via email or fax.    Follow-up in my clinic back again in 4 weeks time.    Thank you for your visit today.    If you have any questions or concerns please feel free to reach out of the office.       ==========================================================================  Austin Mcnamara MD  Department of Endocrine, Diabetes and Metabolism  [facility], [place]  ==========================================================================  "

## 2024-04-11 ENCOUNTER — TELEPHONE (OUTPATIENT)
Dept: ENDOCRINOLOGY | Facility: CLINIC | Age: 24
End: 2024-04-11
Payer: COMMERCIAL

## 2024-04-16 ENCOUNTER — TELEPHONE (OUTPATIENT)
Dept: ENDOCRINOLOGY | Facility: CLINIC | Age: 24
End: 2024-04-16
Payer: COMMERCIAL

## 2024-04-16 NOTE — TELEPHONE ENCOUNTER
Reviewed BG log from 4/8-4/14/24. All BG WNL. Pt to continue to manage GDM with diet lifestyle and continue sending weekly BG log for review.

## 2024-04-25 ENCOUNTER — HOSPITAL ENCOUNTER (OUTPATIENT)
Facility: HOSPITAL | Age: 24
Discharge: HOME | End: 2024-04-25
Attending: STUDENT IN AN ORGANIZED HEALTH CARE EDUCATION/TRAINING PROGRAM | Admitting: STUDENT IN AN ORGANIZED HEALTH CARE EDUCATION/TRAINING PROGRAM
Payer: COMMERCIAL

## 2024-04-25 VITALS
RESPIRATION RATE: 16 BRPM | WEIGHT: 293 LBS | DIASTOLIC BLOOD PRESSURE: 66 MMHG | HEART RATE: 77 BPM | TEMPERATURE: 98.4 F | HEIGHT: 66 IN | SYSTOLIC BLOOD PRESSURE: 105 MMHG | BODY MASS INDEX: 47.09 KG/M2 | OXYGEN SATURATION: 97 %

## 2024-04-25 LAB
BILIRUB UR QL STRIP: NEGATIVE
CLARITY UR: CLEAR
COLOR UR: YELLOW
GLUCOSE UR STRIP-MCNC: NEGATIVE MG/DL
HGB UR QL STRIP.AUTO: NEGATIVE
KETONES UR QL STRIP: NEGATIVE
LEUKOCYTE ESTERASE UR QL STRIP.AUTO: NEGATIVE
NITRITE UR QL STRIP: NEGATIVE
PH UR STRIP.AUTO: 8 [PH] (ref 5–8)
PROT UR QL STRIP: NEGATIVE
SP GR UR STRIP: 1.01 (ref 1–1.03)
UROBILINOGEN UR QL STRIP: NORMAL

## 2024-04-25 PROCEDURE — 81003 URINALYSIS AUTO W/O SCOPE: CPT | Performed by: STUDENT IN AN ORGANIZED HEALTH CARE EDUCATION/TRAINING PROGRAM

## 2024-04-25 PROCEDURE — G0463 HOSPITAL OUTPT CLINIC VISIT: HCPCS

## 2024-04-25 NOTE — NON STRESS TEST
Obstetrical Non-stress Test Interpretation     Name:  Verónica Rivers  MRN: [id number]    23 y.o. female  at 35w1d    Indication: Per Dr. Patel      Baseline: 135  Variability:   Moderate   Accelerations: 15x15  Decelerations: absent  Contractions:  No contractions      Impression:  category 1      Hillary Caputo RN  2024  15:55 EDT

## 2024-04-26 ENCOUNTER — TELEPHONE (OUTPATIENT)
Dept: ENDOCRINOLOGY | Facility: CLINIC | Age: 24
End: 2024-04-26
Payer: COMMERCIAL

## 2024-04-26 NOTE — TELEPHONE ENCOUNTER
Reviewed BG log from 4/15-4/21/24. All BG WNL. Pt to continue to manage GDM with diet lifestyle and continue sending weekly BG log for review. Pt states that she has been walking one hour daily, but has not met her stress management goal of meditating 3 times weekly. See media.

## 2024-04-30 LAB — EXTERNAL GROUP B STREP ANTIGEN: NEGATIVE

## 2024-05-01 ENCOUNTER — TELEPHONE (OUTPATIENT)
Dept: ENDOCRINOLOGY | Facility: CLINIC | Age: 24
End: 2024-05-01
Payer: COMMERCIAL

## 2024-05-13 ENCOUNTER — ANESTHESIA (OUTPATIENT)
Dept: LABOR AND DELIVERY | Facility: HOSPITAL | Age: 24
End: 2024-05-13
Payer: COMMERCIAL

## 2024-05-13 ENCOUNTER — APPOINTMENT (OUTPATIENT)
Dept: ULTRASOUND IMAGING | Facility: HOSPITAL | Age: 24
End: 2024-05-13
Payer: COMMERCIAL

## 2024-05-13 ENCOUNTER — HOSPITAL ENCOUNTER (INPATIENT)
Facility: HOSPITAL | Age: 24
LOS: 2 days | Discharge: HOME | End: 2024-05-15
Attending: STUDENT IN AN ORGANIZED HEALTH CARE EDUCATION/TRAINING PROGRAM | Admitting: STUDENT IN AN ORGANIZED HEALTH CARE EDUCATION/TRAINING PROGRAM
Payer: COMMERCIAL

## 2024-05-13 ENCOUNTER — ANESTHESIA EVENT (OUTPATIENT)
Dept: LABOR AND DELIVERY | Facility: HOSPITAL | Age: 24
End: 2024-05-13
Payer: COMMERCIAL

## 2024-05-13 PROBLEM — Z34.90 PREGNANT AND NOT YET DELIVERED: Status: ACTIVE | Noted: 2024-05-13

## 2024-05-13 LAB
A1 MICROGLOB PLACENTAL VAG QL: POSITIVE
ABO GROUP BLD: NORMAL
ALBUMIN SERPL-MCNC: 3.5 G/DL (ref 3.5–5.2)
ALBUMIN/GLOB SERPL: 1 G/DL
ALP SERPL-CCNC: 118 U/L (ref 39–117)
ALT SERPL W P-5'-P-CCNC: 13 U/L (ref 1–33)
AMPHET+METHAMPHET UR QL: NEGATIVE
ANION GAP SERPL CALCULATED.3IONS-SCNC: 13 MMOL/L (ref 5–15)
AST SERPL-CCNC: 21 U/L (ref 1–32)
BACTERIA UR QL AUTO: ABNORMAL /HPF
BARBITURATES UR QL SCN: NEGATIVE
BASOPHILS # BLD AUTO: 0.04 10*3/MM3 (ref 0–0.2)
BASOPHILS NFR BLD AUTO: 0.3 % (ref 0–1.5)
BENZODIAZ UR QL SCN: NEGATIVE
BILIRUB SERPL-MCNC: 0.2 MG/DL (ref 0–1.2)
BILIRUB UR QL STRIP: NEGATIVE
BLD GP AB SCN SERPL QL: NEGATIVE
BUN SERPL-MCNC: 9 MG/DL (ref 6–20)
BUN/CREAT SERPL: 15.8 (ref 7–25)
CALCIUM SPEC-SCNC: 9 MG/DL (ref 8.6–10.5)
CANNABINOIDS SERPL QL: NEGATIVE
CHLORIDE SERPL-SCNC: 106 MMOL/L (ref 98–107)
CLARITY UR: ABNORMAL
CO2 SERPL-SCNC: 18 MMOL/L (ref 22–29)
COCAINE UR QL: NEGATIVE
COD CRY URNS QL: ABNORMAL /HPF
COLOR UR: YELLOW
CREAT SERPL-MCNC: 0.57 MG/DL (ref 0.57–1)
DEPRECATED RDW RBC AUTO: 38.8 FL (ref 37–54)
EGFRCR SERPLBLD CKD-EPI 2021: 131.1 ML/MIN/1.73
EOSINOPHIL # BLD AUTO: 0.05 10*3/MM3 (ref 0–0.4)
EOSINOPHIL NFR BLD AUTO: 0.4 % (ref 0.3–6.2)
ERYTHROCYTE [DISTWIDTH] IN BLOOD BY AUTOMATED COUNT: 13.4 % (ref 12.3–15.4)
GLOBULIN UR ELPH-MCNC: 3.6 GM/DL
GLUCOSE BLDC GLUCOMTR-MCNC: 107 MG/DL (ref 70–105)
GLUCOSE BLDC GLUCOMTR-MCNC: 96 MG/DL (ref 70–105)
GLUCOSE SERPL-MCNC: 83 MG/DL (ref 65–99)
GLUCOSE UR STRIP-MCNC: NEGATIVE MG/DL
HCT VFR BLD AUTO: 36 % (ref 34–46.6)
HGB BLD-MCNC: 11.7 G/DL (ref 12–15.9)
HGB UR QL STRIP.AUTO: ABNORMAL
HYALINE CASTS UR QL AUTO: ABNORMAL /LPF
IMM GRANULOCYTES # BLD AUTO: 0.08 10*3/MM3 (ref 0–0.05)
IMM GRANULOCYTES NFR BLD AUTO: 0.6 % (ref 0–0.5)
KETONES UR QL STRIP: ABNORMAL
LEUKOCYTE ESTERASE UR QL STRIP.AUTO: ABNORMAL
LYMPHOCYTES # BLD AUTO: 2.69 10*3/MM3 (ref 0.7–3.1)
LYMPHOCYTES NFR BLD AUTO: 19.4 % (ref 19.6–45.3)
MCH RBC QN AUTO: 26 PG (ref 26.6–33)
MCHC RBC AUTO-ENTMCNC: 32.5 G/DL (ref 31.5–35.7)
MCV RBC AUTO: 80 FL (ref 79–97)
METHADONE UR QL SCN: NEGATIVE
MONOCYTES # BLD AUTO: 0.78 10*3/MM3 (ref 0.1–0.9)
MONOCYTES NFR BLD AUTO: 5.6 % (ref 5–12)
NEUTROPHILS NFR BLD AUTO: 10.26 10*3/MM3 (ref 1.7–7)
NEUTROPHILS NFR BLD AUTO: 73.7 % (ref 42.7–76)
NITRITE UR QL STRIP: NEGATIVE
NRBC BLD AUTO-RTO: 0 /100 WBC (ref 0–0.2)
OPIATES UR QL: NEGATIVE
OXYCODONE UR QL SCN: NEGATIVE
PH UR STRIP.AUTO: 6 [PH] (ref 5–8)
PLATELET # BLD AUTO: 284 10*3/MM3 (ref 140–450)
PMV BLD AUTO: 10.1 FL (ref 6–12)
POTASSIUM SERPL-SCNC: 4.1 MMOL/L (ref 3.5–5.2)
PROT SERPL-MCNC: 7.1 G/DL (ref 6–8.5)
PROT UR QL STRIP: ABNORMAL
RBC # BLD AUTO: 4.5 10*6/MM3 (ref 3.77–5.28)
RBC # UR STRIP: ABNORMAL /HPF
REF LAB TEST METHOD: ABNORMAL
RH BLD: POSITIVE
SODIUM SERPL-SCNC: 137 MMOL/L (ref 136–145)
SP GR UR STRIP: 1.03 (ref 1–1.03)
SQUAMOUS #/AREA URNS HPF: ABNORMAL /HPF
T&S EXPIRATION DATE: NORMAL
UROBILINOGEN UR QL STRIP: ABNORMAL
WBC # UR STRIP: ABNORMAL /HPF
WBC NRBC COR # BLD AUTO: 13.9 10*3/MM3 (ref 3.4–10.8)

## 2024-05-13 PROCEDURE — 80307 DRUG TEST PRSMV CHEM ANLYZR: CPT | Performed by: STUDENT IN AN ORGANIZED HEALTH CARE EDUCATION/TRAINING PROGRAM

## 2024-05-13 PROCEDURE — 86850 RBC ANTIBODY SCREEN: CPT | Performed by: STUDENT IN AN ORGANIZED HEALTH CARE EDUCATION/TRAINING PROGRAM

## 2024-05-13 PROCEDURE — 86780 TREPONEMA PALLIDUM: CPT | Performed by: STUDENT IN AN ORGANIZED HEALTH CARE EDUCATION/TRAINING PROGRAM

## 2024-05-13 PROCEDURE — 81001 URINALYSIS AUTO W/SCOPE: CPT | Performed by: STUDENT IN AN ORGANIZED HEALTH CARE EDUCATION/TRAINING PROGRAM

## 2024-05-13 PROCEDURE — 82948 REAGENT STRIP/BLOOD GLUCOSE: CPT | Performed by: STUDENT IN AN ORGANIZED HEALTH CARE EDUCATION/TRAINING PROGRAM

## 2024-05-13 PROCEDURE — 86900 BLOOD TYPING SEROLOGIC ABO: CPT | Performed by: STUDENT IN AN ORGANIZED HEALTH CARE EDUCATION/TRAINING PROGRAM

## 2024-05-13 PROCEDURE — 25810000003 LACTATED RINGERS PER 1000 ML: Performed by: STUDENT IN AN ORGANIZED HEALTH CARE EDUCATION/TRAINING PROGRAM

## 2024-05-13 PROCEDURE — 76815 OB US LIMITED FETUS(S): CPT

## 2024-05-13 PROCEDURE — 85025 COMPLETE CBC W/AUTO DIFF WBC: CPT | Performed by: STUDENT IN AN ORGANIZED HEALTH CARE EDUCATION/TRAINING PROGRAM

## 2024-05-13 PROCEDURE — 80053 COMPREHEN METABOLIC PANEL: CPT | Performed by: STUDENT IN AN ORGANIZED HEALTH CARE EDUCATION/TRAINING PROGRAM

## 2024-05-13 PROCEDURE — C1755 CATHETER, INTRASPINAL: HCPCS | Performed by: ANESTHESIOLOGY

## 2024-05-13 PROCEDURE — 82948 REAGENT STRIP/BLOOD GLUCOSE: CPT

## 2024-05-13 PROCEDURE — 84112 EVAL AMNIOTIC FLUID PROTEIN: CPT | Performed by: STUDENT IN AN ORGANIZED HEALTH CARE EDUCATION/TRAINING PROGRAM

## 2024-05-13 PROCEDURE — 25010000002 MORPHINE PER 10 MG: Performed by: STUDENT IN AN ORGANIZED HEALTH CARE EDUCATION/TRAINING PROGRAM

## 2024-05-13 PROCEDURE — 86901 BLOOD TYPING SEROLOGIC RH(D): CPT | Performed by: STUDENT IN AN ORGANIZED HEALTH CARE EDUCATION/TRAINING PROGRAM

## 2024-05-13 PROCEDURE — 87086 URINE CULTURE/COLONY COUNT: CPT | Performed by: STUDENT IN AN ORGANIZED HEALTH CARE EDUCATION/TRAINING PROGRAM

## 2024-05-13 RX ORDER — MISOPROSTOL 200 UG/1
800 TABLET ORAL ONCE AS NEEDED
Status: CANCELLED | OUTPATIENT
Start: 2024-05-13

## 2024-05-13 RX ORDER — OXYTOCIN/0.9 % SODIUM CHLORIDE 30/500 ML
999 PLASTIC BAG, INJECTION (ML) INTRAVENOUS ONCE
Status: CANCELLED | OUTPATIENT
Start: 2024-05-13 | End: 2024-05-13

## 2024-05-13 RX ORDER — ONDANSETRON 4 MG/1
4 TABLET, ORALLY DISINTEGRATING ORAL EVERY 6 HOURS PRN
Status: DISCONTINUED | OUTPATIENT
Start: 2024-05-13 | End: 2024-05-14

## 2024-05-13 RX ORDER — OXYTOCIN/0.9 % SODIUM CHLORIDE 30/500 ML
2-20 PLASTIC BAG, INJECTION (ML) INTRAVENOUS
Status: DISCONTINUED | OUTPATIENT
Start: 2024-05-13 | End: 2024-05-14

## 2024-05-13 RX ORDER — CARBOPROST TROMETHAMINE 250 UG/ML
250 INJECTION, SOLUTION INTRAMUSCULAR
Status: CANCELLED | OUTPATIENT
Start: 2024-05-13

## 2024-05-13 RX ORDER — LIDOCAINE HYDROCHLORIDE AND EPINEPHRINE 10; 10 MG/ML; UG/ML
INJECTION, SOLUTION INFILTRATION; PERINEURAL AS NEEDED
Status: DISCONTINUED | OUTPATIENT
Start: 2024-05-13 | End: 2024-05-14 | Stop reason: SURG

## 2024-05-13 RX ORDER — LIDOCAINE HCL/EPINEPHRINE/PF 2%-1:200K
VIAL (ML) INJECTION
Status: ACTIVE
Start: 2024-05-13 | End: 2024-05-14

## 2024-05-13 RX ORDER — ONDANSETRON 2 MG/ML
4 INJECTION INTRAMUSCULAR; INTRAVENOUS EVERY 6 HOURS PRN
Status: DISCONTINUED | OUTPATIENT
Start: 2024-05-13 | End: 2024-05-14

## 2024-05-13 RX ORDER — FENTANYL/BUPIVACAINE/NS/PF 2-1250MCG
PLASTIC BAG, INJECTION (ML) INJECTION CONTINUOUS PRN
Status: DISCONTINUED | OUTPATIENT
Start: 2024-05-13 | End: 2024-05-14 | Stop reason: SURG

## 2024-05-13 RX ORDER — SODIUM CHLORIDE 9 MG/ML
40 INJECTION, SOLUTION INTRAVENOUS AS NEEDED
Status: DISCONTINUED | OUTPATIENT
Start: 2024-05-13 | End: 2024-05-14

## 2024-05-13 RX ORDER — SODIUM CHLORIDE, SODIUM LACTATE, POTASSIUM CHLORIDE, CALCIUM CHLORIDE 600; 310; 30; 20 MG/100ML; MG/100ML; MG/100ML; MG/100ML
125 INJECTION, SOLUTION INTRAVENOUS CONTINUOUS
Status: DISCONTINUED | OUTPATIENT
Start: 2024-05-13 | End: 2024-05-14

## 2024-05-13 RX ORDER — OXYTOCIN/0.9 % SODIUM CHLORIDE 30/500 ML
250 PLASTIC BAG, INJECTION (ML) INTRAVENOUS CONTINUOUS
Status: CANCELLED | OUTPATIENT
Start: 2024-05-13 | End: 2024-05-13

## 2024-05-13 RX ORDER — FENTANYL/BUPIVACAINE/NS/PF 2-1250MCG
PLASTIC BAG, INJECTION (ML) INJECTION
Status: COMPLETED
Start: 2024-05-13 | End: 2024-05-14

## 2024-05-13 RX ORDER — ACETAMINOPHEN 325 MG/1
650 TABLET ORAL EVERY 4 HOURS PRN
Status: DISCONTINUED | OUTPATIENT
Start: 2024-05-13 | End: 2024-05-14

## 2024-05-13 RX ORDER — SODIUM CHLORIDE 0.9 % (FLUSH) 0.9 %
10 SYRINGE (ML) INJECTION AS NEEDED
Status: DISCONTINUED | OUTPATIENT
Start: 2024-05-13 | End: 2024-05-14

## 2024-05-13 RX ORDER — SODIUM CHLORIDE 0.9 % (FLUSH) 0.9 %
10 SYRINGE (ML) INJECTION EVERY 12 HOURS SCHEDULED
Status: DISCONTINUED | OUTPATIENT
Start: 2024-05-13 | End: 2024-05-14

## 2024-05-13 RX ORDER — MORPHINE SULFATE 2 MG/ML
2 INJECTION, SOLUTION INTRAMUSCULAR; INTRAVENOUS
Status: DISCONTINUED | OUTPATIENT
Start: 2024-05-13 | End: 2024-05-14

## 2024-05-13 RX ORDER — METHYLERGONOVINE MALEATE 0.2 MG/ML
200 INJECTION INTRAVENOUS ONCE AS NEEDED
Status: CANCELLED | OUTPATIENT
Start: 2024-05-13

## 2024-05-13 RX ORDER — LIDOCAINE HYDROCHLORIDE 10 MG/ML
0.5 INJECTION, SOLUTION EPIDURAL; INFILTRATION; INTRACAUDAL; PERINEURAL ONCE AS NEEDED
Status: DISCONTINUED | OUTPATIENT
Start: 2024-05-13 | End: 2024-05-14

## 2024-05-13 RX ADMIN — SODIUM CHLORIDE, POTASSIUM CHLORIDE, SODIUM LACTATE AND CALCIUM CHLORIDE 125 ML/HR: 600; 310; 30; 20 INJECTION, SOLUTION INTRAVENOUS at 20:25

## 2024-05-13 RX ADMIN — Medication 2 MILLI-UNITS/MIN: at 21:07

## 2024-05-13 RX ADMIN — MORPHINE SULFATE 2 MG: 2 INJECTION, SOLUTION INTRAMUSCULAR; INTRAVENOUS at 20:58

## 2024-05-13 RX ADMIN — LIDOCAINE HYDROCHLORIDE,EPINEPHRINE BITARTRATE 4 ML: 10; .01 INJECTION, SOLUTION INFILTRATION; PERINEURAL at 22:37

## 2024-05-13 RX ADMIN — SODIUM CHLORIDE, POTASSIUM CHLORIDE, SODIUM LACTATE AND CALCIUM CHLORIDE 125 ML/HR: 600; 310; 30; 20 INJECTION, SOLUTION INTRAVENOUS at 23:30

## 2024-05-13 RX ADMIN — Medication 10 ML: at 20:58

## 2024-05-13 RX ADMIN — Medication 12 ML/HR: at 22:38

## 2024-05-13 NOTE — H&P
"DILIA Francisco  Obstetric History and Physical     Chief Complaint: SROM at term and in latent labor     Subjective     Patient is a 23 y.o. female  currently at 37+5 , who presents with contractions in latent labor and concern for SROM, confirmed on admission.    Her prenatal care is c/b GDMA1, obesity, COVID this pregnancy, and FGR with AC 9%tile.        Prenatal Information:  See office H&P for full details and labs.      Past OB History:       OB History    Para Term  AB Living   2 0 0 0 1 0   SAB IAB Ectopic Molar Multiple Live Births   0 0 0 0 0 0               Past Medical History: Past Medical History:   Diagnosis Date    Gestational diabetes     Migraine         Past Surgical History Past Surgical History:   Procedure Laterality Date    APPENDECTOMY      LAPAROSCOPIC APPENDECTOMY  2019        Family History: Family History   Problem Relation Age of Onset    Diabetes Mother       Social History:  reports that she has never smoked. She has never used smokeless tobacco.   reports that she does not currently use alcohol.   reports no history of drug use.        General ROS: Pertinent items are noted in HPI    Objective      Vitals:     Vitals:    24 1804 24 1813 24 1815   BP:  124/60    Pulse:  87    Resp:  20    Temp:  98.8 °F (37.1 °C)    TempSrc:  Oral    SpO2:   97%   Weight: (!) 140 kg (308 lb 10.3 oz)     Height: 167.6 cm (66\")         Fetal Heart Rate Assessment:   Category 1    [place]:   Difficult to trace, every 2-4 mins while at bedside      Physical Exam:     General Appearance:    Alert, cooperative, in no acute distress   Abdomen:     Soft, non-tender, no guarding, no rebound tenderness,       EFW 6#4, 30%tile on formal US 24   Pelvic Exam:    Pelvis adequate.    Presentation: Vertex confirmed on US at bedside    Cervix: 2/50/-2 and clear fluid on admission, exam by RN    Extremities:   Moves all extremities well, no edema, no cyanosis, no              " redness   Skin:   No bleeding, bruising or rash   Neurologic:   No focal neurologic defect          Laboratory Results:   Lab Results (last 48 hours)       Procedure Component Value Units Date/Time    Urine Drug Screen - Urine, Clean Catch [[id number]] Collected: 05/13/24 1819    Specimen: Urine, Clean Catch Updated: 05/13/24 1928    Urinalysis, Microscopic Only - Urine, Clean Catch [[id number]]  (Abnormal) Collected: 05/13/24 1819    Specimen: Urine, Clean Catch Updated: 05/13/24 1856     RBC, UA Too Numerous to Count /HPF      WBC, UA 11-20 /HPF      Bacteria, UA 3+ /HPF      Squamous Epithelial Cells, UA 13-20 /HPF      Hyaline Casts, UA None Seen /LPF      Calcium Oxalate Crystals, UA Moderate/2+ /HPF      Methodology Manual Light Microscopy    Rapid Assay For ROM - Amniotic Fluid, Amniotic Sac [[id number]]  (Abnormal) Collected: 05/13/24 1819    Specimen: Amniotic Fluid from Amniotic Sac Updated: 05/13/24 1853     Rupture of Membranes Positive    Urinalysis With Culture If Indicated - Urine, Clean Catch [[id number]]  (Abnormal) Collected: 05/13/24 1819    Specimen: Urine, Clean Catch Updated: 05/13/24 1832     Color, UA Yellow     Appearance, UA Cloudy     pH, UA 6.0     Specific Gravity, UA 1.026     Glucose, UA Negative     Ketones, UA Trace     Bilirubin, UA Negative     Blood, UA Large (3+)     Protein, UA 30 mg/dL (1+)     Leuk Esterase, UA Trace     Nitrite, UA Negative     Urobilinogen, UA 1.0 E.U./dL    Narrative:      In absence of clinical symptoms, the presence of pyuria, bacteria, and/or nitrites on the urinalysis result does not correlate with infection.    Urine Culture - Urine, Urine, Clean Catch [[id number]] Collected: 05/13/24 1819    Specimen: Urine, Clean Catch Updated: 05/13/24 1831    Group B Streptococcus Culture - Swab, Vaginal/Rectum [[id number]] Resulted: 04/30/24    Specimen: Swab from Vaginal/Rectum Updated: 05/13/24 1829     External Strep Group B Ag Negative    POC Glucose Once  [[id number]]  (Abnormal) Collected: 05/13/24 1818    Specimen: Blood Updated: 05/13/24 1820     Glucose 107 mg/dL      Comment: Serial Number: [id number]:  [id number]                  Assessment & Plan     Principal Problem:    Pregnant and not yet delivered         Assessment:  1.  Intrauterine pregnancy at 37+5 wks gestation.    2.  Labor  3.  GBS status:Negative    Plan:  1. Augmentation c pitocin.   2. Plan of care has been reviewed with patient.  3.  Risks, benefits of treatment plan have been discussed.  4.  All questions have been answered.         Zulma Patel MD   5/13/2024   19:38 EDT

## 2024-05-14 LAB
GLUCOSE BLDC GLUCOMTR-MCNC: 106 MG/DL (ref 70–105)
GLUCOSE BLDC GLUCOMTR-MCNC: 107 MG/DL (ref 70–105)
GLUCOSE BLDC GLUCOMTR-MCNC: 123 MG/DL (ref 70–105)
GLUCOSE BLDC GLUCOMTR-MCNC: 93 MG/DL (ref 70–105)
GLUCOSE BLDC GLUCOMTR-MCNC: 93 MG/DL (ref 70–105)
GLUCOSE BLDC GLUCOMTR-MCNC: 97 MG/DL (ref 70–105)
GLUCOSE BLDC GLUCOMTR-MCNC: 98 MG/DL (ref 70–105)
T PALLIDUM IGG SER QL: NORMAL

## 2024-05-14 PROCEDURE — 82948 REAGENT STRIP/BLOOD GLUCOSE: CPT | Performed by: STUDENT IN AN ORGANIZED HEALTH CARE EDUCATION/TRAINING PROGRAM

## 2024-05-14 PROCEDURE — 25010000002 ONDANSETRON PER 1 MG: Performed by: STUDENT IN AN ORGANIZED HEALTH CARE EDUCATION/TRAINING PROGRAM

## 2024-05-14 PROCEDURE — 82948 REAGENT STRIP/BLOOD GLUCOSE: CPT

## 2024-05-14 PROCEDURE — 0KQM0ZZ REPAIR PERINEUM MUSCLE, OPEN APPROACH: ICD-10-PCS | Performed by: OBSTETRICS & GYNECOLOGY

## 2024-05-14 PROCEDURE — 25810000003 LACTATED RINGERS PER 1000 ML: Performed by: STUDENT IN AN ORGANIZED HEALTH CARE EDUCATION/TRAINING PROGRAM

## 2024-05-14 RX ORDER — HYDROCORTISONE ACETATE PRAMOXINE HCL 2.5; 1 G/100G; G/100G
1 CREAM TOPICAL AS NEEDED
Status: DISCONTINUED | OUTPATIENT
Start: 2024-05-14 | End: 2024-05-15 | Stop reason: HOSPADM

## 2024-05-14 RX ORDER — EPHEDRINE SULFATE 5 MG/ML
10 INJECTION INTRAVENOUS
Status: DISCONTINUED | OUTPATIENT
Start: 2024-05-14 | End: 2024-05-14

## 2024-05-14 RX ORDER — BISACODYL 10 MG
10 SUPPOSITORY, RECTAL RECTAL DAILY PRN
Status: DISCONTINUED | OUTPATIENT
Start: 2024-05-15 | End: 2024-05-15 | Stop reason: HOSPADM

## 2024-05-14 RX ORDER — ACETAMINOPHEN 325 MG/1
650 TABLET ORAL EVERY 6 HOURS PRN
Status: DISCONTINUED | OUTPATIENT
Start: 2024-05-14 | End: 2024-05-15 | Stop reason: HOSPADM

## 2024-05-14 RX ORDER — OXYTOCIN-SODIUM CHLORIDE 0.9% IV SOLN 30 UNIT/500ML 30-0.9/5 UT/ML-%
125 SOLUTION INTRAVENOUS CONTINUOUS PRN
Status: DISCONTINUED | OUTPATIENT
Start: 2024-05-14 | End: 2024-05-15 | Stop reason: HOSPADM

## 2024-05-14 RX ORDER — FENTANYL/BUPIVACAINE/NS/PF 2-1250MCG
PLASTIC BAG, INJECTION (ML) INJECTION CONTINUOUS
Status: DISCONTINUED | OUTPATIENT
Start: 2024-05-14 | End: 2024-05-14

## 2024-05-14 RX ORDER — SODIUM CHLORIDE 0.9 % (FLUSH) 0.9 %
1-10 SYRINGE (ML) INJECTION AS NEEDED
Status: DISCONTINUED | OUTPATIENT
Start: 2024-05-14 | End: 2024-05-15 | Stop reason: HOSPADM

## 2024-05-14 RX ORDER — FERROUS SULFATE 324(65)MG
324 TABLET, DELAYED RELEASE (ENTERIC COATED) ORAL 2 TIMES DAILY WITH MEALS
Status: DISCONTINUED | OUTPATIENT
Start: 2024-05-14 | End: 2024-05-15 | Stop reason: HOSPADM

## 2024-05-14 RX ORDER — PRENATAL VIT/IRON FUM/FOLIC AC 27MG-0.8MG
1 TABLET ORAL DAILY
Status: DISCONTINUED | OUTPATIENT
Start: 2024-05-14 | End: 2024-05-15 | Stop reason: HOSPADM

## 2024-05-14 RX ORDER — DOCUSATE SODIUM 100 MG/1
100 CAPSULE, LIQUID FILLED ORAL 2 TIMES DAILY
Status: DISCONTINUED | OUTPATIENT
Start: 2024-05-14 | End: 2024-05-15 | Stop reason: HOSPADM

## 2024-05-14 RX ORDER — ONDANSETRON 4 MG/1
4 TABLET, ORALLY DISINTEGRATING ORAL EVERY 8 HOURS PRN
Status: DISCONTINUED | OUTPATIENT
Start: 2024-05-14 | End: 2024-05-15 | Stop reason: HOSPADM

## 2024-05-14 RX ORDER — IBUPROFEN 600 MG/1
600 TABLET ORAL EVERY 6 HOURS PRN
Status: DISCONTINUED | OUTPATIENT
Start: 2024-05-14 | End: 2024-05-15 | Stop reason: HOSPADM

## 2024-05-14 RX ADMIN — IBUPROFEN 600 MG: 600 TABLET, FILM COATED ORAL at 12:06

## 2024-05-14 RX ADMIN — ACETAMINOPHEN 650 MG: 325 TABLET, FILM COATED ORAL at 20:30

## 2024-05-14 RX ADMIN — FERROUS SULFATE TAB EC 324 MG (65 MG FE EQUIVALENT) 324 MG: 324 (65 FE) TABLET DELAYED RESPONSE at 20:30

## 2024-05-14 RX ADMIN — Medication: at 04:47

## 2024-05-14 RX ADMIN — Medication 1 G: at 12:06

## 2024-05-14 RX ADMIN — SODIUM CHLORIDE, POTASSIUM CHLORIDE, SODIUM LACTATE AND CALCIUM CHLORIDE 125 ML/HR: 600; 310; 30; 20 INJECTION, SOLUTION INTRAVENOUS at 08:20

## 2024-05-14 RX ADMIN — WITCH HAZEL: 500 SOLUTION RECTAL; TOPICAL at 12:06

## 2024-05-14 RX ADMIN — PRENATAL VITAMINS-IRON FUMARATE 27 MG IRON-FOLIC ACID 0.8 MG TABLET 1 TABLET: at 20:29

## 2024-05-14 RX ADMIN — ONDANSETRON 4 MG: 2 INJECTION INTRAMUSCULAR; INTRAVENOUS at 07:35

## 2024-05-14 RX ADMIN — DOCUSATE SODIUM 100 MG: 100 CAPSULE, LIQUID FILLED ORAL at 20:30

## 2024-05-14 NOTE — PLAN OF CARE
Goal Outcome Evaluation:      Patient  delivered via  today at 0939. Patient was epiduralized and epidural medication was stopped at 0940. Patient's postpartum recovery was scheduled to be finished at 1200 and patient was able to lift both legs off of bed on command. Patient was assisted to upright position and assisted to edge of bed. Patient denied dizziness and expressed slight tingling in bilateral legs. Patient was able to lift legs on command while sitting at edge of bed. Patient was able to stand at bedside with this RN standing beside her for support. Patient attempted to lift right leg off the floor and replace it, and was successful. Patient attempted to lift left leg off the floor, lost control, and lowered to the floor on her knees. This RN and patient's  attempted to lift patient to wheelchair in front of patient, or bed behind patient, but patient was unable to regain strength to lift herself to either surface. Gait belt was applied and Nora ESTRADA RN assisted patient to standing position in order for patient to gain leverage to return to a sitting position on the side of the bed. Full assessment and vitals were performed and patient did not sustain injuries from fall and denied pain afterward. Patient is now in fall precautions and has successfully ambulated with assistance since the incident. Patient has been instructed to ask for assistance when ambulating. Will continue to monitor.

## 2024-05-14 NOTE — ANESTHESIA PROCEDURE NOTES
Labor Epidural    Pre-sedation assessment completed: 5/13/2024 10:30 PM    Patient reassessed immediately prior to procedure    Patient location during procedure: OB  Start Time: 5/13/2024 10:30 PM  Performed By  Anesthesiologist: Brad Renae MD  Preanesthetic Checklist  Completed: patient identified, IV checked, site marked, risks and benefits discussed, surgical consent, monitors and equipment checked, pre-op evaluation and timeout performed  Additional Notes  IUP at 37w5d  Prep:  Pt Position:sitting  Sterile Tech:gloves, cap and sterile barrier  Prep:chlorhexidine gluconate and isopropyl alcohol  Monitoring:continuous pulse oximetry, EKG and blood pressure monitoring  Epidural Block Procedure:  Approach:midline  Guidance:palpation technique  Location:L4-L5  Needle Type:Tuohy  Needle Gauge:17 G  Loss of Resistance Medium: saline  Cath Depth at skin:15 cm  Paresthesia: none  Aspiration:negative  Test Dose:negative  Number of Attempts: 1  Post Assessment:  Dressing:secured with tape and occlusive dressing applied  Pt Tolerance:patient tolerated the procedure well with no apparent complications  Complications:no

## 2024-05-14 NOTE — PROGRESS NOTES
" [facility]  Obstetric Progress Note    Subjective   Patient is resting in bed, pain controlled with epidural in place.     Objective     Vitals:  Vitals:    05/14/24 0430 05/14/24 0500 05/14/24 0530 05/14/24 0600   BP: 120/66 117/61 110/61 103/56   Pulse: 54 68 71 74   Resp:  18     Temp:  98.3 °F (36.8 °C)     TempSrc:  Oral     SpO2:       Weight:       Height:         Flowsheet Rows      Flowsheet Row First Filed Value   Admission Height 167.6 cm (66\") Documented at 05/13/2024 1804   Admission Weight 140 kg (308 lb 10.3 oz) Documented at 05/13/2024 1804            Intake/Output Summary (Last 24 hours) at 5/14/2024 0631  Last data filed at 5/13/2024 2212  Gross per 24 hour   Intake --   Output 100 ml   Net -100 ml       Fetal Heart Rate Assessment:   Cat I   [place]:  Ctx q 2-4, difficult to trace in current position     Physical Exam:  General: Patient is in no acute distress    Pelvic Exam: 8/90/0            Assessment & Plan     Principal Problem:    Pregnant and not yet delivered         Assessment:  1.  Intrauterine pregnancy at 37+6 wks gestation.    2.  Labor, pregnancy C/B FGR and GDMA1  3.  GBS status:Negative     Plan:  1. Augmentation c pitocin.   2. Plan of care has been reviewed with patient.  3.  Risks, benefits of treatment plan have been discussed.  4.  All questions have been answered  5. GDMA1: BG every 1 hour now in active labor, all Bgs been wnl since admission ranging   6. EFW: 6#4, overall 30%, AC 9.5%ashleigh Patel MD  5/14/2024  06:31 EDT      "

## 2024-05-14 NOTE — ANESTHESIA PREPROCEDURE EVALUATION
Anesthesia Evaluation     Patient summary reviewed and Nursing notes reviewed   NPO Solid Status: > 8 hours             Airway   Mallampati: II  TM distance: >3 FB  Neck ROM: full  No difficulty expected  Dental - normal exam     Pulmonary - negative pulmonary ROS and normal exam   Cardiovascular - negative cardio ROS and normal exam        Neuro/Psych- negative ROS  GI/Hepatic/Renal/Endo - negative ROS   (+) morbid obesity, diabetes mellitus gestational    Musculoskeletal (-) negative ROS    Abdominal  - normal exam    Bowel sounds: normal.   Substance History - negative use     OB/GYN negative ob/gyn ROS   (+) Pregnant        Other                    Anesthesia Plan    ASA 2     epidural     (IUP at 37w5d)    Anesthetic plan, risks, benefits, and alternatives have been provided, discussed and informed consent has been obtained with: patient.    CODE STATUS:    Level Of Support Discussed With: Patient  Code Status (Patient has no pulse and is not breathing): CPR (Attempt to Resuscitate)  Medical Interventions (Patient has pulse or is breathing): Full Support

## 2024-05-14 NOTE — L&D DELIVERY NOTE
[facility]  Vaginal Delivery Note    Diagnosis     Patient is a 23 y.o. female  currently at 37w6d, who presents with SROM at term.      Delivery     Delivery:  Spontaneous Vaginal Delivery    Date of Delivery:  2024   Anesthesia:  Epidural   Delivering clinician: Pinky Spangler MD      Delivery narrative:  Pt progressed along normal labor curve.  She received epidural.  She progressed to complete dilation and pushed for 20 mns.  I was present at time of delivery, I presented with infant head . She delivered a LVFI, position CHIDI. Infants head delivered atraumatically, followed by delivery of the rest of the infants body. There were no nuchal cords. Cord was clamped and cut and infant was passed to mothers abdomen. Infant had good cry, color, and tone, and was moving all 4 extremities. Cord blood was obtained and sent for analysis. Placenta was delivered spontaneously and intact with a 3 vessel cord. Pitocin was given IV and fundal massage was applied for hemostasis.  The vagina and rectum were examined and there was second degree lacerations requiring repair. Good hemostasis following delivery.      Infant    Findings: VFI     Apgars: 8 & 9 at 1 and 5 minutes.      Placenta, Cord, and Fluid     Delayed cord clamping performed per routine.       Placenta delivered spontaneous, intact  3VC      Bimanual massage and Pitocin 30 units in 500 mL bolus given after placental delivery.  There was good hemostasis and a firm uterine tone.        Lacerations       had a 2nd degree laceration   Quantitiative Blood Loss  300  mL     Sponge and needle counts correct x 2.     Disposition  Mother to Mother Baby/Postpartum  in stable condition currently.  Baby to remains with mom  in stable condition currently.      Pinky Spangler MD  24  10:15 EDT

## 2024-05-14 NOTE — PLAN OF CARE
Problem: Adult Inpatient Plan of Care  Goal: Plan of Care Review  Outcome: Ongoing, Progressing   Goal Outcome Evaluation:            Pt's pain is under control with epidural. Awaiting delivery at this time.

## 2024-05-15 VITALS
RESPIRATION RATE: 15 BRPM | SYSTOLIC BLOOD PRESSURE: 96 MMHG | HEIGHT: 66 IN | HEART RATE: 78 BPM | WEIGHT: 293 LBS | TEMPERATURE: 98.4 F | DIASTOLIC BLOOD PRESSURE: 66 MMHG | BODY MASS INDEX: 47.09 KG/M2 | OXYGEN SATURATION: 96 %

## 2024-05-15 PROBLEM — Z34.90 PREGNANT AND NOT YET DELIVERED: Status: RESOLVED | Noted: 2024-05-13 | Resolved: 2024-05-15

## 2024-05-15 LAB
BACTERIA SPEC AEROBE CULT: NORMAL
BASOPHILS # BLD AUTO: 0.04 10*3/MM3 (ref 0–0.2)
BASOPHILS NFR BLD AUTO: 0.3 % (ref 0–1.5)
DEPRECATED RDW RBC AUTO: 45.4 FL (ref 37–54)
EOSINOPHIL # BLD AUTO: 0.09 10*3/MM3 (ref 0–0.4)
EOSINOPHIL NFR BLD AUTO: 0.7 % (ref 0.3–6.2)
ERYTHROCYTE [DISTWIDTH] IN BLOOD BY AUTOMATED COUNT: 13.9 % (ref 12.3–15.4)
HCT VFR BLD AUTO: 29.9 % (ref 34–46.6)
HGB BLD-MCNC: 8.8 G/DL (ref 12–15.9)
IMM GRANULOCYTES # BLD AUTO: 0.07 10*3/MM3 (ref 0–0.05)
IMM GRANULOCYTES NFR BLD AUTO: 0.5 % (ref 0–0.5)
LYMPHOCYTES # BLD AUTO: 2.98 10*3/MM3 (ref 0.7–3.1)
LYMPHOCYTES NFR BLD AUTO: 22.7 % (ref 19.6–45.3)
MCH RBC QN AUTO: 26.3 PG (ref 26.6–33)
MCHC RBC AUTO-ENTMCNC: 29.4 G/DL (ref 31.5–35.7)
MCV RBC AUTO: 89.3 FL (ref 79–97)
MONOCYTES # BLD AUTO: 0.74 10*3/MM3 (ref 0.1–0.9)
MONOCYTES NFR BLD AUTO: 5.6 % (ref 5–12)
NEUTROPHILS NFR BLD AUTO: 70.2 % (ref 42.7–76)
NEUTROPHILS NFR BLD AUTO: 9.23 10*3/MM3 (ref 1.7–7)
NRBC BLD AUTO-RTO: 0 /100 WBC (ref 0–0.2)
PLATELET # BLD AUTO: 218 10*3/MM3 (ref 140–450)
PMV BLD AUTO: 10.1 FL (ref 6–12)
RBC # BLD AUTO: 3.35 10*6/MM3 (ref 3.77–5.28)
WBC NRBC COR # BLD AUTO: 13.15 10*3/MM3 (ref 3.4–10.8)

## 2024-05-15 PROCEDURE — 97161 PT EVAL LOW COMPLEX 20 MIN: CPT | Performed by: PHYSICAL THERAPIST

## 2024-05-15 PROCEDURE — 85025 COMPLETE CBC W/AUTO DIFF WBC: CPT | Performed by: OBSTETRICS & GYNECOLOGY

## 2024-05-15 RX ORDER — FERROUS SULFATE 324(65)MG
324 TABLET, DELAYED RELEASE (ENTERIC COATED) ORAL 2 TIMES DAILY WITH MEALS
Qty: 30 TABLET | Refills: 1 | Status: SHIPPED | OUTPATIENT
Start: 2024-05-15

## 2024-05-15 RX ORDER — IBUPROFEN 600 MG/1
600 TABLET ORAL EVERY 6 HOURS PRN
Qty: 30 TABLET | Refills: 1 | Status: SHIPPED | OUTPATIENT
Start: 2024-05-15

## 2024-05-15 RX ADMIN — MAGNESIUM HYDROXIDE 10 ML: 2400 SUSPENSION ORAL at 00:15

## 2024-05-15 RX ADMIN — DOCUSATE SODIUM 100 MG: 100 CAPSULE, LIQUID FILLED ORAL at 08:35

## 2024-05-15 RX ADMIN — FERROUS SULFATE TAB EC 324 MG (65 MG FE EQUIVALENT) 324 MG: 324 (65 FE) TABLET DELAYED RESPONSE at 08:36

## 2024-05-15 RX ADMIN — ACETAMINOPHEN 650 MG: 325 TABLET, FILM COATED ORAL at 05:01

## 2024-05-15 RX ADMIN — PRENATAL VITAMINS-IRON FUMARATE 27 MG IRON-FOLIC ACID 0.8 MG TABLET 1 TABLET: at 08:36

## 2024-05-15 RX ADMIN — IBUPROFEN 600 MG: 600 TABLET, FILM COATED ORAL at 00:15

## 2024-05-15 NOTE — PROGRESS NOTES
Case Management Discharge Note           Provided Post Acute Provider List?: N/A  Provided Post Acute Provider Quality & Resource List?: N/A    Selected Continued Care - Discharged on 5/15/2024 Admission date: 5/13/2024 - Discharge disposition: Home or Self Care      Destination    No services have been selected for the patient.                Durable Medical Equipment    No services have been selected for the patient.                Dialysis/Infusion    No services have been selected for the patient.                Home Medical Care    No services have been selected for the patient.                Therapy    No services have been selected for the patient.                Community Resources    No services have been selected for the patient.                Community & DME    No services have been selected for the patient.                         Final Discharge Disposition Code: 01 - home or self-care

## 2024-05-15 NOTE — PLAN OF CARE
Goal Outcome Evaluation:      Patient discharged to home with homecare instructions and follow up in 6  weeks with OB

## 2024-05-15 NOTE — SIGNIFICANT NOTE
Pt expressed she no longer wants to breastfeed. Pt educated on the benefits of breastfeeding. Bottles and nipples were provided.

## 2024-05-15 NOTE — PAYOR COMM NOTE
"NOTIFICATION OF DISCHARGE:      AUTH # [id number]  Verónica Rivers (23 y.o. Female) 2000      DISCHARGED TO HOME ON 05/15/2024        AUTHORIZATION PENDING:   PLEASE CALL OR FAX DETERMINATION TO CONTACT BELOW. THANK YOU.        Laila Roy RN MSN  /UR  [facility]  [phone] office  [phone] fax  [email or web address]    [facility]  NPI: [id number]  Tax: [id number]          Verónica Rivers (23 y.o. Female)       Date of Birth   2000    Social Security Number       Address   [address] [place] [address] [place] [facility]    Home Phone   [phone]    MRN   [id number]       Jain   None    Marital Status   Single                            Admission Date   24    Admission Type   Elective    Admitting Provider   Zulma Patel MD    Attending Provider       Department, Room/Bed   [facility] MOTHER BABY, M405/1       Discharge Date   5/15/2024    Discharge Disposition   Home or Self Care    Discharge Destination                                 Attending Provider: (none)   Allergies: No Known Allergies    Isolation: None   Infection: None   Code Status: CPR    Ht: 167.6 cm (66\")   Wt: 138 kg (303 lb 6.4 oz)    Admission Cmt: None   Principal Problem:  (spontaneous vaginal delivery) [O80]                   Active Insurance as of 2024       Primary Coverage       Payor Plan Insurance Group Employer/Plan Group    [employer] BLUE CROSS [employer]M BLUE CROSS BLUE SHIELD PPO [id number]       Payor Plan Address Payor Plan Phone Number Payor Plan Fax Number Effective Dates    [address] [phone]  2016 - None Entered    [facility] [address]         Subscriber Name Subscriber Birth Date Member ID       ZARINA BUTLER 1970 [id number]               Secondary Coverage       Payor Plan Insurance Group Employer/Plan Group    ANTHEM MEDICAID [facility] -[id number] [id number]       Payor Plan Address Payor Plan Phone Number Payor " Plan Fax Number Effective Dates    MAIL STOP:   2023 - None Entered    [address] [address]       [facility] [address]         Subscriber Name Subscriber Birth Date Member ID       APARNA BENAVIDEZ 2000 [id number]                     Emergency Contacts        (Rel.) Home Phone Work Phone Mobile Phone    BARRETT AUGUST (Significant Other) -- -- [phone]    MARY BUTLER (Mother) -- -- [phone]                 Discharge Summary        Cherelle Cooley, APRN at 05/15/24 1029          [facility]  Delivery Discharge Summary    Primary OB Clinician: Zulma Patel MD    Admission Diagnosis:  Active Problems:    * No active hospital problems. *  37w6d IUP  SROM    Discharge Diagnosis:  Same, delivered    Gestational Age: 37w6d    Date of Delivery: 2024     Delivered By:  Pinky Spangler     Delivery Type: Vaginal, Spontaneous      Tubal Ligation: n/a    Intrapartum Course: Uncomplicated delivery.     Postpartum Course:  Pt was admitted and underwent  Spontaneous Vaginal Delivery. Pt was transferred to PP where she had an uncomplicated course. Pt remained AFVSS, had scant lochia and pain was well controlled. Pt d/c home in stable condition and will f/u in office for PP visit as scheduled or PRN. Currently bottle feeding. Plans on condoms  for contraception. Pt voiding /s difficulty and passing flatus. Denies headache, visual changes, or RUQ pain. Pt is ambulating without numbness or weakness in legs. Pt feels well and desires d/c on PPD1.     Physical Exam:    Vitals:   Vitals:    24 1620 24 2322 05/15/24 0448 05/15/24 0732   BP:  114/78 105/76 96/66   BP Location:    Left arm   Patient Position:    Lying   Pulse: 80 93 82 78   Resp: 18 18 18 15   Temp: 98.7 °F (37.1 °C) 98.3 °F (36.8 °C) 98.7 °F (37.1 °C) 98.4 °F (36.9 °C)   TempSrc: Oral Oral Oral Oral   SpO2: 97% 97% 96% 96%   Weight:  (!) 138 kg (303 lb 6.4 oz)     Height:         Temp (24hrs), Av.7 °F (37.1 °C), Min:98.3  °F (36.8 °C), Max:99 °F (37.2 °C)      General Appearance:    Alert, cooperative, in no acute distress   Abdomen:     Soft non-tender, non-distended, no guarding, no rebound         tenderness.   Extremities:   Moves all extremities well, no edema, no cyanosis, no              Redness.   Incision:   N/a   Fundus:   Firm, below umbilicus     Feeding method: Breastfeeding Status: Yes    Labs:  Results from last 7 days   Lab Units 05/15/24  0531 05/13/24 2034   WBC 10*3/mm3 13.15* 13.90*   HEMOGLOBIN g/dL 8.8* 11.7*   HEMATOCRIT % 29.9* 36.0   PLATELETS 10*3/mm3 218 284     Results from last 7 days   Lab Units 05/13/24 2034   GLUCOSE mg/dL 83       Blood Type: RH Positive      Plan:  Discharge to home.   Continue FeSO4 bid   Follow-up appointment with Dr Salcedo in 6 weeks.  All discharge instructions were reviewed with pt including bleeding warnings, s/sx of pp depression, and warning signs in the pp period for which to seek medical attention including but not limited to s/sx of hypertension and thromboembolism.         INNA Alcaraz  5/15/2024  10:29 EDT      /d    Electronically signed by Cherelle Cooley APRN at 05/15/24 1031       Discharge Order (From admission, onward)       Start     Ordered    05/15/24 1029  Discharge patient  Once        Expected Discharge Date: 05/15/24   Discharge Disposition: Home or Self Care   Physician of Record for Attribution - Please select from Treatment Team: JEWEL SALCEDO [[id number]]   Review needed by CMO to determine Physician of Record: No   Please choose which facility the patient is currently admitted if they are being discharged to another facility or unit.: DILIA [facility]      Question Answer Comment   Physician of Record for Attribution - Please select from Treatment Team JEWEL SALCEDO    Review needed by CMO to determine Physician of Record No    Please choose which facility the patient is currently admitted if they are being discharged to another facility or unit. DILIA  [facility]        05/15/24 1029

## 2024-05-15 NOTE — PLAN OF CARE
Goal Outcome Evaluation:  Plan of Care Reviewed With: patient           Outcome Evaluation: Patient is a 24 y/o F,, who came to [facility] 37w6d gestation.  She had a vaginal birth 24, with a 2nd degree laceration.  During today's evaluation PT provided pt with handout regarding postpartum healing post vaginal birth.  She demonstrated good understanding of material after education.  Provided pt with handout to keep, and contact information is present if pt has any additional PT needs/questions while in the hospital or post discharge.    Vaginal Patient education provided on:   -Body changes after pregnancy, grade of tear and what structures involved.   -Pelvic floor musculature, Transversus abdominus muscle  -Diaphragmatic breathing  -Proper kegal performance, as well as importance of relaxation   -PERCY  -Body mechanics   -Proper breathing/pressure management   -Toileting posture   -Benefits of exercise  -Basic/beginning exercise 0-2 weeks, 2-6 weeks, and after 6 weeks  -Postpartum safe stretches   -UI  -Perineal scar massage   -When it may be beneficial to see a Pelvic PT

## 2024-05-15 NOTE — THERAPY EVALUATION
Patient Name: Verónica Rivers  : 2000    MRN: [id number]                              Today's Date: 5/15/2024       Admit Date: 2024    Visit Dx: No diagnosis found.  Patient Active Problem List   Diagnosis   (none) - all problems resolved or deleted     Past Medical History:   Diagnosis Date    Gestational diabetes     Migraine      Past Surgical History:   Procedure Laterality Date    APPENDECTOMY      LAPAROSCOPIC APPENDECTOMY  2019      General Information       Row Name 05/15/24 1050          Physical Therapy Time and Intention    Document Type evaluation  -EJ     Mode of Treatment physical therapy  -       Row Name 05/15/24 1050          General Information    Patient Profile Reviewed yes  -EJ     Prior Level of Function independent:  -EJ     Existing Precautions/Restrictions lifting  -EJ     Barriers to Rehab none identified  -       Row Name 05/15/24 1050          Living Environment    People in Home significant other  -EJ     Name(s) of People in Home Sy (SO)  -       Row Name 05/15/24 1050          Cognition    Orientation Status (Cognition) oriented x 4  -EJ               User Key  (r) = Recorded By, (t) = Taken By, (c) = Cosigned By      Initials Name Provider Type    EJ Trini Russo, PT Physical Therapist                   Mobility       Row Name 05/15/24 1101          Bed Mobility    Bed Mobility bed mobility (all) activities  -     All Activities, Nixon (Bed Mobility) independent  -       Row Name 05/15/24 1101          Sit-Stand Transfer    Sit-Stand Nixon (Transfers) independent  -       Row Name 05/15/24 1101          Gait/Stairs (Locomotion)    Nixon Level (Gait) independent  -EJ     Distance in Feet (Gait) --  Pt is up ad crystal in her room and hallway as tolerated.  Pt did have 1 fall this admit when getting up for the first time after delivery. Per chart review pt went down to her knees, gait belt was applied after she was on her knees, &  was then assisted up.  -EJ               User Key  (r) = Recorded By, (t) = Taken By, (c) = Cosigned By      Initials Name Provider Type    Trini Palafox, PT Physical Therapist                   Obj/Interventions       Row Name 05/15/24 1103          Range of Motion Comprehensive    General Range of Motion no range of motion deficits identified  -[facility] Name 05/15/24 1103          Strength Comprehensive (MMT)    Comment, General Manual Muscle Testing (MMT) Assessment Pt strength NT this date due to recent delivery.  Pt will likely benefit from pelvic floor/core strengthening/assessment once able.  -EJ               User Key  (r) = Recorded By, (t) = Taken By, (c) = Cosigned By      Initials Name Provider Type    Trini Palafox, PT Physical Therapist                   Goals/Plan    No documentation.                  Clinical Impression       [facility] Name 05/15/24 110          Pain    Additional Documentation Pain Scale: FACES Pre/Post-Treatment (Group)  -EJ       Row Name 05/15/24 110          Pain Scale: FACES Pre/Post-Treatment    Pain: FACES Scale, Pretreatment 2-->hurts little bit  -EJ     Posttreatment Pain Rating 2-->hurts little bit  -EJ     Pain Location - perineum  -[facility] Name 05/15/24 110          Plan of Care Review    Plan of Care Reviewed With patient  -     Outcome Evaluation Patient is a 24 y/o F,, who came to [facility] 37w6d gestation.  She had a vaginal birth 24, with a 2nd degree laceration.  During today's evaluation PT provided pt with handout regarding postpartum healing post vaginal birth.  She demonstrated good understanding of material after education.  Provided pt with handout to keep, and contact information is present if pt has any additional PT needs/questions while in the hospital or post discharge.  -       Row Name 05/15/24 110          Therapy Assessment/Plan (PT)    Criteria for Skilled Interventions Met (PT) no  -EJ     Therapy Frequency (PT)  evaluation only  -EJ     Predicted Duration of Therapy Intervention (PT) discharge  -EJ       Row Name 05/15/24 1105          Positioning and Restraints    Pre-Treatment Position in bed  -EJ     Post Treatment Position bed  -EJ     In Bed fowlers;call light within reach  -EJ               User Key  (r) = Recorded By, (t) = Taken By, (c) = Cosigned By      Initials Name Provider Type    Trini Palafox, PT Physical Therapist                   Outcome Measures       Row Name 05/15/24 1106          How much help from another person do you currently need...    Turning from your back to your side while in flat bed without using bedrails? 4  -EJ     Moving from lying on back to sitting on the side of a flat bed without bedrails? 4  -EJ     Moving to and from a bed to a chair (including a wheelchair)? 4  -EJ     Standing up from a chair using your arms (e.g., wheelchair, bedside chair)? 4  -EJ     Climbing 3-5 steps with a railing? 4  -EJ     To walk in hospital room? 4  -EJ     AM-PAC 6 Clicks Score (PT) 24  -EJ     Highest Level of Mobility Goal 8 --> Walked 250 feet or more  -       Row Name 05/15/24 1106          Functional Assessment    Outcome Measure Options AM-PAC 6 Clicks Basic Mobility (PT)  -EJ               User Key  (r) = Recorded By, (t) = Taken By, (c) = Cosigned By      Initials Name Provider Type    Trini Palafox, PT Physical Therapist                                 Physical Therapy Education       Title: PT OT SLP Therapies (Done)       Topic: Physical Therapy (Done)       Point: Mobility training (Done)       Learning Progress Summary             Patient Acceptance, E,H, VU by LISA at 5/15/2024 1107                         Point: Home exercise program (Done)       Learning Progress Summary             Patient Acceptance, E,H, VU by LISA at 5/15/2024 1107                         Point: Body mechanics (Done)       Learning Progress Summary             Patient Acceptance, E,H, VU by LISA at 5/15/2024  1107                         Point: Precautions (Done)       Learning Progress Summary             Patient Acceptance, E,H, VU by  at 5/15/2024 1107                                         User Key       Initials Effective Dates Name Provider Type [facility] 23 -  Trini Russo, PT Physical Therapist PT                  PT Recommendation and Plan     Plan of Care Reviewed With: patient  Outcome Evaluation: Patient is a 22 y/o F,, who came to [facility] 37w6d gestation.  She had a vaginal birth 24, with a 2nd degree laceration.  During today's evaluation PT provided pt with handout regarding postpartum healing post vaginal birth.  She demonstrated good understanding of material after education.  Provided pt with handout to keep, and contact information is present if pt has any additional PT needs/questions while in the hospital or post discharge.    Vaginal Patient education provided on:   -Body changes after pregnancy, grade of tear and what structures involved.   -Pelvic floor musculature, Transversus abdominus muscle  -Diaphragmatic breathing  -Proper kegal performance, as well as importance of relaxation   -PERCY  -Body mechanics   -Proper breathing/pressure management   -Toileting posture   -Benefits of exercise  -Basic/beginning exercise 0-2 weeks, 2-6 weeks, and after 6 weeks  -Postpartum safe stretches   -UI  -Perineal scar massage   -When it may be beneficial to see a Pelvic PT       Time Calculation:         PT Charges       Row Name 05/15/24 1107             Time Calculation    Start Time 0955  -      Stop Time 1013  -      Time Calculation (min) 18 min  -      PT Received On 05/15/24  -         Time Calculation- PT    Total Timed Code Minutes- PT 0 minute(s)  -                User Key  (r) = Recorded By, (t) = Taken By, (c) = Cosigned By      Initials Name Provider Type     Trini Russo, PT Physical Therapist                  Therapy Charges for Today       Code  Description Service Date Service Provider Modifiers Qty    89547562831 HC PT EVAL LOW COMPLEXITY 3 5/15/2024 Trini Russo, PT GP 1            PT G-Codes  Outcome Measure Options: AM-PAC 6 Clicks Basic Mobility (PT)  AM-PAC 6 Clicks Score (PT): 24  PT Discharge Summary  Anticipated Discharge Disposition (PT): home    Trini Russo, PT  5/15/2024

## 2024-05-15 NOTE — DISCHARGE SUMMARY
[facility]  Delivery Discharge Summary    Primary OB Clinician: Zulma Patel MD    Admission Diagnosis:  Active Problems:    * No active hospital problems. *  37w6d IUP  SROM    Discharge Diagnosis:  Same, delivered    Gestational Age: 37w6d    Date of Delivery: 2024     Delivered By:  Pinky Spangler     Delivery Type: Vaginal, Spontaneous      Tubal Ligation: n/a    Intrapartum Course: Uncomplicated delivery.     Postpartum Course:  Pt was admitted and underwent  Spontaneous Vaginal Delivery. Pt was transferred to PP where she had an uncomplicated course. Pt remained AFVSS, had scant lochia and pain was well controlled. Pt d/c home in stable condition and will f/u in office for PP visit as scheduled or PRN. Currently bottle feeding. Plans on condoms  for contraception. Pt voiding /s difficulty and passing flatus. Denies headache, visual changes, or RUQ pain. Pt is ambulating without numbness or weakness in legs. Pt feels well and desires d/c on PPD1.     Physical Exam:    Vitals:   Vitals:    24 1620 24 2322 05/15/24 0448 05/15/24 0732   BP:  114/78 105/76 96/66   BP Location:    Left arm   Patient Position:    Lying   Pulse: 80 93 82 78   Resp: 18 18 18 15   Temp: 98.7 °F (37.1 °C) 98.3 °F (36.8 °C) 98.7 °F (37.1 °C) 98.4 °F (36.9 °C)   TempSrc: Oral Oral Oral Oral   SpO2: 97% 97% 96% 96%   Weight:  (!) 138 kg (303 lb 6.4 oz)     Height:         Temp (24hrs), Av.7 °F (37.1 °C), Min:98.3 °F (36.8 °C), Max:99 °F (37.2 °C)      General Appearance:    Alert, cooperative, in no acute distress   Abdomen:     Soft non-tender, non-distended, no guarding, no rebound         tenderness.   Extremities:   Moves all extremities well, no edema, no cyanosis, no              Redness.   Incision:   N/a   Fundus:   Firm, below umbilicus     Feeding method: Breastfeeding Status: Yes    Labs:  Results from last 7 days   Lab Units 05/15/24  0531 24   WBC 10*3/mm3 13.15* 13.90*   HEMOGLOBIN g/dL 8.8*  11.7*   HEMATOCRIT % 29.9* 36.0   PLATELETS 10*3/mm3 218 284     Results from last 7 days   Lab Units 05/13/24 2034   GLUCOSE mg/dL 83       Blood Type: RH Positive      Plan:  Discharge to home.   Continue FeSO4 bid   Follow-up appointment with Dr Patel in 6 weeks.  All discharge instructions were reviewed with pt including bleeding warnings, s/sx of pp depression, and warning signs in the pp period for which to seek medical attention including but not limited to s/sx of hypertension and thromboembolism.         Cherelle Cooley, APRN  5/15/2024  10:29 EDT      /d

## 2024-05-15 NOTE — PLAN OF CARE
Problem: Adult Inpatient Plan of Care  Goal: Plan of Care Review  Outcome: Ongoing, Progressing  Flowsheets (Taken 5/15/2024 0540)  Progress: improving  Plan of Care Reviewed With: patient  Goal: Patient-Specific Goal (Individualized)  Outcome: Ongoing, Progressing  Goal: Absence of Hospital-Acquired Illness or Injury  Outcome: Ongoing, Progressing  Intervention: Identify and Manage Fall Risk  Recent Flowsheet Documentation  Taken 5/15/2024 0513 by Peggy Schafer RN  Safety Promotion/Fall Prevention: safety round/check completed  Taken 5/15/2024 0456 by Peggy Schafer RN  Safety Promotion/Fall Prevention: safety round/check completed  Taken 5/15/2024 0211 by Peggy Schafer RN  Safety Promotion/Fall Prevention: safety round/check completed  Taken 5/15/2024 0117 by Peggy Schafer RN  Safety Promotion/Fall Prevention: safety round/check completed  Taken 5/14/2024 2352 by Peggy Schafer RN  Safety Promotion/Fall Prevention: safety round/check completed  Taken 5/14/2024 2218 by Peggy Schafer RN  Safety Promotion/Fall Prevention: safety round/check completed  Taken 5/14/2024 2143 by Peggy Schafer RN  Safety Promotion/Fall Prevention: safety round/check completed  Taken 5/14/2024 2105 by Peggy Schafer RN  Safety Promotion/Fall Prevention: safety round/check completed  Intervention: Prevent Skin Injury  Recent Flowsheet Documentation  Taken 5/15/2024 0513 by Peggy Schafer RN  Body Position: position changed independently  Taken 5/15/2024 0211 by Peggy Schafer RN  Body Position: position changed independently  Taken 5/15/2024 0117 by Peggy Schafer RN  Body Position:   supine   side-lying  Taken 5/14/2024 2143 by Peggy Schafer RN  Body Position:   side-lying   left  Taken 5/14/2024 2105 by Peggy Schafer RN  Body Position: sitting up in bed  Intervention: Prevent and Manage VTE (Venous Thromboembolism) Risk  Recent Flowsheet Documentation  Taken 5/15/2024 0513 by Gilmar  ALVA Woods  Activity Management: back to bed  Taken 5/15/2024 0211 by Peggy Schafer RN  Activity Management:   ambulated to bathroom   back to bed  Taken 5/15/2024 0117 by Peggy Schafer RN  Activity Management: back to bed  Taken 5/14/2024 2352 by Peggy Schafer RN  Activity Management:   ambulated to bathroom   back to bed  Taken 5/14/2024 2218 by Peggy Schafer RN  Activity Management:   ambulated to bathroom   ambulated in room  Goal: Optimal Comfort and Wellbeing  Outcome: Ongoing, Progressing  Intervention: Monitor Pain and Promote Comfort  Recent Flowsheet Documentation  Taken 5/15/2024 0513 by Peggy Schafer RN  Pain Management Interventions: see MAR  Goal: Readiness for Transition of Care  Outcome: Ongoing, Progressing     Problem: Bleeding (Labor)  Goal: Hemostasis  Outcome: Ongoing, Progressing     Problem: Change in Fetal Wellbeing (Labor)  Goal: Stable Fetal Wellbeing  Outcome: Ongoing, Progressing  Intervention: Promote and Monitor Fetal Wellbeing  Recent Flowsheet Documentation  Taken 5/15/2024 0513 by Peggy Schafer RN  Body Position: position changed independently  Taken 5/15/2024 0211 by Peggy Schafer RN  Body Position: position changed independently  Taken 5/15/2024 0117 by Peggy Schafer RN  Body Position:   supine   side-lying  Taken 5/14/2024 2143 by Peggy Schafer RN  Body Position:   side-lying   left  Taken 5/14/2024 2105 by Peggy Schafer RN  Body Position: sitting up in bed     Problem: Delayed Labor Progression (Labor)  Goal: Effective Progression to Delivery  Outcome: Ongoing, Progressing     Problem: Infection (Labor)  Goal: Absence of Infection Signs and Symptoms  Outcome: Ongoing, Progressing     Problem: Labor Pain (Labor)  Goal: Acceptable Pain Control  Outcome: Ongoing, Progressing     Problem: Uterine Tachysystole (Labor)  Goal: Normal Uterine Contraction Pattern  Outcome: Ongoing, Progressing     Problem: Maternal-Fetal Wellbeing  Goal: Optimal  Maternal-Fetal Wellbeing  Outcome: Ongoing, Progressing     Problem: Fall Injury Risk  Goal: Absence of Fall and Fall-Related Injury  Outcome: Ongoing, Progressing  Intervention: Promote Injury-Free Environment  Recent Flowsheet Documentation  Taken 5/15/2024 0513 by Peggy Schafer RN  Safety Promotion/Fall Prevention: safety round/check completed  Taken 5/15/2024 0456 by Peggy Schafer RN  Safety Promotion/Fall Prevention: safety round/check completed  Taken 5/15/2024 0211 by Peggy Schafer RN  Safety Promotion/Fall Prevention: safety round/check completed  Taken 5/15/2024 0117 by Peggy Schafer RN  Safety Promotion/Fall Prevention: safety round/check completed  Taken 5/14/2024 2352 by Peggy Schafer RN  Safety Promotion/Fall Prevention: safety round/check completed  Taken 5/14/2024 2218 by Peggy Schafer RN  Safety Promotion/Fall Prevention: safety round/check completed  Taken 5/14/2024 2143 by Peggy Schafer RN  Safety Promotion/Fall Prevention: safety round/check completed  Taken 5/14/2024 2105 by Peggy Schafer RN  Safety Promotion/Fall Prevention: safety round/check completed   Goal Outcome Evaluation:  Plan of Care Reviewed With: patient        Progress: improving